# Patient Record
Sex: MALE | Race: WHITE | NOT HISPANIC OR LATINO | URBAN - METROPOLITAN AREA
[De-identification: names, ages, dates, MRNs, and addresses within clinical notes are randomized per-mention and may not be internally consistent; named-entity substitution may affect disease eponyms.]

---

## 2023-01-11 ENCOUNTER — OFFICE VISIT (OUTPATIENT)
Dept: FAMILY MEDICINE CLINIC | Facility: CLINIC | Age: 63
End: 2023-01-11

## 2023-01-11 VITALS
HEART RATE: 77 BPM | OXYGEN SATURATION: 97 % | TEMPERATURE: 97.4 F | WEIGHT: 233.8 LBS | RESPIRATION RATE: 16 BRPM | HEIGHT: 72 IN | BODY MASS INDEX: 31.67 KG/M2 | SYSTOLIC BLOOD PRESSURE: 170 MMHG | DIASTOLIC BLOOD PRESSURE: 98 MMHG

## 2023-01-11 DIAGNOSIS — Z00.00 ANNUAL PHYSICAL EXAM: Primary | ICD-10-CM

## 2023-01-11 DIAGNOSIS — I10 PRIMARY HYPERTENSION: ICD-10-CM

## 2023-01-11 DIAGNOSIS — K40.90 LEFT INGUINAL HERNIA: ICD-10-CM

## 2023-01-11 DIAGNOSIS — Z13.6 SCREENING FOR CARDIOVASCULAR CONDITION: ICD-10-CM

## 2023-01-11 DIAGNOSIS — Z13.1 SCREENING FOR DIABETES MELLITUS: ICD-10-CM

## 2023-01-11 DIAGNOSIS — Z12.5 SCREENING FOR PROSTATE CANCER: ICD-10-CM

## 2023-01-11 DIAGNOSIS — R53.83 OTHER FATIGUE: ICD-10-CM

## 2023-01-11 DIAGNOSIS — Z12.2 SCREENING FOR LUNG CANCER: ICD-10-CM

## 2023-01-11 DIAGNOSIS — Z23 IMMUNIZATION DUE: ICD-10-CM

## 2023-01-11 DIAGNOSIS — Z12.11 SCREENING FOR COLON CANCER: ICD-10-CM

## 2023-01-11 DIAGNOSIS — L72.0 EPIDERMAL INCLUSION CYST: ICD-10-CM

## 2023-01-11 DIAGNOSIS — E55.9 VITAMIN D DEFICIENCY: ICD-10-CM

## 2023-01-11 RX ORDER — LISINOPRIL 10 MG/1
10 TABLET ORAL DAILY
Qty: 90 TABLET | Refills: 3 | Status: SHIPPED | OUTPATIENT
Start: 2023-01-11

## 2023-01-11 RX ORDER — MULTIVIT WITH MINERALS/LUTEIN
1000 TABLET ORAL DAILY
COMMUNITY

## 2023-01-11 NOTE — PATIENT INSTRUCTIONS

## 2023-01-11 NOTE — PROGRESS NOTES
[facility]    NAME: Bladimir Clayton  AGE: 60 y o  SEX: male  : 1960     DATE: 2023     Assessment and Plan:     Problem List Items Addressed This Visit    None  Visit Diagnoses     Annual physical exam    -  Primary    Bill is stable on exam   He is to work on a lower carb/salt diet, and getting regular exercise  FBW ordered  Primary hypertension        Starting Lisinopril at this time  Relevant Medications    lisinopril (ZESTRIL) 10 mg tablet    Screening for diabetes mellitus        Relevant Orders    Comprehensive metabolic panel    Screening for cardiovascular condition        Relevant Orders    Lipid Panel with Direct LDL reflex    Other fatigue        Relevant Orders    CBC and differential    TSH, 3rd generation with Free T4 reflex    Screening for prostate cancer        PSA incl with FBW ordered  Relevant Orders    PSA, Total Screen    Screening for colon cancer        Pt referred to GI  Relevant Orders    Ambulatory Referral to Gastroenterology    Vitamin D deficiency        Hx of - Vit D level incl with FBW ordered  Relevant Orders    Vitamin D 25 hydroxy    Immunization due        Flu Vaccine given IM, and tolerated well  Relevant Orders    influenza vaccine, quadrivalent, recombinant, PF, 0 5 mL, for patients 18 yr+ (FLUBLOK) (Completed)    Screening for lung cancer        Pt quit smoking in  -   LDCT Chest ordered  Relevant Orders    CT lung screening program    Left inguinal hernia        Pt referred to General Surgery  Relevant Orders    Ambulatory Referral to General Surgery    Epidermal inclusion cyst        As above  Relevant Orders    Ambulatory Referral to General Surgery          Immunizations and preventive care screenings were discussed with patient today  Appropriate education was printed on patient's after visit summary      Discussed risks and benefits of prostate cancer screening  We discussed the controversial history of PSA screening for prostate cancer in the [place] as well as the risk of over detection and over treatment of prostate cancer by way of PSA screening  The patient understands that PSA blood testing is an imperfect way to screen for prostate cancer and that elevated PSA levels in the blood may also be caused by infection, inflammation, prostatic trauma or manipulation, urological procedures, or by benign prostatic enlargement  The role of the digital rectal examination in prostate cancer screening was also discussed and I discussed with him that there is large interobserver variability in the findings of digital rectal examination  Counseling:  Dental Health: discussed importance of regular tooth brushing, flossing, and dental visits  · Exercise: the importance of regular exercise/physical activity was discussed  Recommend exercise 3-5 times per week for at least 30 minutes  BMI Counseling: Body mass index is 31 32 kg/m²  The BMI is above normal  Nutrition recommendations include moderation in carbohydrate intake  Exercise recommendations include exercising 3-5 times per week  No pharmacotherapy was ordered  Patient referred to PCP  Rationale for BMI follow-up plan is due to patient being overweight or obese  Depression Screening and Follow-up Plan: Patient was screened for depression during today's encounter  They screened negative with a PHQ-2 score of 0  Return in 3 months (on 4/11/2023) for Recheck  Chief Complaint:     Chief Complaint   Patient presents with   • New Patient Visit   • Physical Exam      History of Present Illness:     Adult Annual Physical   Patient here for a comprehensive physical exam  The patient reports no problems  Pt has not seen a doctor in some time now  Has not had colon cancer screening yet - we discussed  He is vaccinated against COV-19    Pt works as a manager at [employer], has his own recruiting business, 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), etc     3 sons  Diet and Physical Activity  · Diet/Nutrition: limited junk food  · Exercise: walking  Depression Screening  PHQ-2/9 Depression Screening    Little interest or pleasure in doing things: 0 - not at all  Feeling down, depressed, or hopeless: 0 - not at all  PHQ-2 Score: 0  PHQ-2 Interpretation: Negative depression screen       General Health  · Sleep: sleeps well  · Hearing: normal - bilateral   · Vision: no vision problems  · Dental: regular dental visits   Health  · Symptoms include: none     Review of Systems:     Review of Systems   Respiratory: Negative for shortness of breath  Cardiovascular: Negative for chest pain  Gastrointestinal: Negative for abdominal pain and blood in stool  Genitourinary: Negative for decreased urine volume and difficulty urinating  Psychiatric/Behavioral: Negative for dysphoric mood  The patient is not nervous/anxious  Past Medical History:     History reviewed  No pertinent past medical history     Past Surgical History:     Past Surgical History:   Procedure Laterality Date   • TONSILLECTOMY        Family History:     Family History   Problem Relation Age of Onset   • Breast cancer Mother    • Liver cancer Paternal Grandmother       Social History:     Social History     Socioeconomic History   • Marital status: /Civil Union     Spouse name: None   • Number of children: None   • Years of education: None   • Highest education level: None   Occupational History   • None   Tobacco Use   • Smoking status: Never   • Smokeless tobacco: Never   Vaping Use   • Vaping Use: Never used   Substance and Sexual Activity   • Alcohol use: Not Currently   • Drug use: Never   • Sexual activity: None   Other Topics Concern   • None   Social History Narrative   • None     Social Determinants of Health     Financial Resource Strain: Not on file   Food Insecurity: Not on file Transportation Needs: Not on file   Physical Activity: Not on file   Stress: Not on file   Social Connections: Not on file   Intimate Partner Violence: Not on file   Housing Stability: Not on file      Current Medications:     Current Outpatient Medications   Medication Sig Dispense Refill   • Ascorbic Acid (vitamin C) 1000 MG tablet Take 1,000 mg by mouth daily     • Cholecalciferol (VITAMIN D3 PO) Take 2,000 Units by mouth     • lisinopril (ZESTRIL) 10 mg tablet Take 1 tablet (10 mg total) by mouth daily 90 tablet 3   • LYSINE PO Take 1,000 mg by mouth     • Magnesium Oxide (MAG-CAPS PO) Take 400 mg by mouth     • Multiple Vitamins-Minerals (ONE-A-DAY 50 PLUS PO) Take by mouth     • Zinc Acetate, Oral, (ZINC ACETATE PO) Take 50 mg by mouth       No current facility-administered medications for this visit  Allergies:     No Known Allergies   Physical Exam:     /98   Pulse 77   Temp (!) 97 4 °F (36 3 °C)   Resp 16   Ht 6' 0 44" (1 84 m)   Wt 106 kg (233 lb 12 8 oz)   SpO2 97%   BMI 31 32 kg/m²   Repeat BP = 160/102 LA seated  Physical Exam  Vitals and nursing note reviewed  Constitutional:       General: He is not in acute distress  Appearance: Normal appearance  He is not ill-appearing, toxic-appearing or diaphoretic  HENT:      Head: Normocephalic and atraumatic  Eyes:      General: No scleral icterus  Conjunctiva/sclera: Conjunctivae normal    Cardiovascular:      Rate and Rhythm: Normal rate and regular rhythm  Heart sounds: Normal heart sounds  No murmur heard  No friction rub  No gallop  Pulmonary:      Effort: Pulmonary effort is normal  No respiratory distress  Breath sounds: Normal breath sounds  No stridor  No wheezing or rales  Chest:      Chest wall: No tenderness  Abdominal:      General: Abdomen is flat  Bowel sounds are normal  There is no distension  Palpations: Abdomen is soft  There is no mass  Tenderness:  There is no abdominal tenderness  There is no guarding or rebound  Hernia: A hernia is present  Hernia is present in the left inguinal area  There is no hernia in the right inguinal area  Genitourinary:     Penis: Normal        Comments: Pt with sizeable, reducible, non-tender, indirect left inguinal hernia; no erythema to the skin  Musculoskeletal:      Cervical back: Normal range of motion and neck supple  No rigidity or tenderness  Lymphadenopathy:      Cervical: No cervical adenopathy  Skin:         Neurological:      Mental Status: He is alert and oriented to person, place, and time  Psychiatric:         Mood and Affect: Mood normal          Behavior: Behavior normal          Thought Content: Thought content normal          Judgment: Judgment normal           José Miguel Em was seen today for new patient visit and physical exam     Diagnoses and all orders for this visit:    Annual physical exam  Comments:  Holden Richmond is stable on exam   He is to work on a lower carb/salt diet, and getting regular exercise  FBW ordered  Primary hypertension  Comments:  Starting Lisinopril at this time  Orders:  -     lisinopril (ZESTRIL) 10 mg tablet; Take 1 tablet (10 mg total) by mouth daily    Screening for diabetes mellitus  -     Comprehensive metabolic panel; Future    Screening for cardiovascular condition  -     Lipid Panel with Direct LDL reflex; Future    Other fatigue  -     CBC and differential; Future  -     TSH, 3rd generation with Free T4 reflex; Future    Screening for prostate cancer  Comments:  PSA incl with FBW ordered  Orders:  -     PSA, Total Screen; Future    Screening for colon cancer  Comments:  Pt referred to GI  Orders:  -     Ambulatory Referral to Gastroenterology; Future    Vitamin D deficiency  Comments:  Hx of - Vit D level incl with FBW ordered  Orders:  -     Vitamin D 25 hydroxy; Future    Immunization due  Comments:  Flu Vaccine given IM, and tolerated well    Orders:  -     influenza vaccine, quadrivalent, recombinant, PF, 0 5 mL, for patients 18 yr+ (FLUBLOK)    Screening for lung cancer  Comments:  Pt quit smoking in 2011 - 2012  LDCT Chest ordered  Orders:  -     CT lung screening program; Future    Left inguinal hernia  Comments:  Pt referred to General Surgery  Orders:  -     Ambulatory Referral to General Surgery; Future    Epidermal inclusion cyst  Comments:  As above  Orders:  -     Ambulatory Referral to General Surgery;  Future          [address],   [address] [facility]

## 2023-01-12 ENCOUNTER — PREP FOR PROCEDURE (OUTPATIENT)
Dept: GASTROENTEROLOGY | Facility: CLINIC | Age: 63
End: 2023-01-12

## 2023-01-12 ENCOUNTER — TELEPHONE (OUTPATIENT)
Dept: GASTROENTEROLOGY | Facility: CLINIC | Age: 63
End: 2023-01-12

## 2023-01-12 DIAGNOSIS — Z12.11 SCREENING FOR COLON CANCER: Primary | ICD-10-CM

## 2023-01-12 NOTE — TELEPHONE ENCOUNTER
Scheduled date of colonoscopy (as of today): 2/14/23    Physician performing colonoscopy: Blaise    Location of colonoscopy: [facility]    Pt PASSED OA

## 2023-01-25 ENCOUNTER — CONSULT (OUTPATIENT)
Dept: SURGERY | Facility: CLINIC | Age: 63
End: 2023-01-25

## 2023-01-25 VITALS
SYSTOLIC BLOOD PRESSURE: 142 MMHG | WEIGHT: 223 LBS | HEIGHT: 72 IN | HEART RATE: 77 BPM | DIASTOLIC BLOOD PRESSURE: 82 MMHG | BODY MASS INDEX: 30.2 KG/M2

## 2023-01-25 DIAGNOSIS — K40.90 LEFT INGUINAL HERNIA: ICD-10-CM

## 2023-01-25 DIAGNOSIS — L72.0 EPIDERMAL INCLUSION CYST: ICD-10-CM

## 2023-01-25 NOTE — PROGRESS NOTES
Assessment/Plan:    Diagnoses and all orders for this visit:    Left inguinal hernia    Epidermal inclusion cyst    Risks and benefits of left inguinal hernia repair as well as excision of left upper back cyst x2 were discussed with him he agrees to proceed  Discussed risks and benefits including potential for recurrence of either the hernia or the cysts, spermatic cord injury, or pain issues and he agrees to proceed  He will check his schedule and be in touch with our office      Subjective:      Patient ID: Nia Giang is a 60 y o  male  Patient presents for left inguinal hernia consult  States he has had a bulge LLQ for 2 to 3 years  Denies pain  Does not limit his activities  Patient presents for evaluation of an upper back cyst   He has had the cyst for 5 to 6 years  Denies pain or size change  Recently underwent a yearly physical     Has had left inguinal hernia for many years does not practically bother him  He is a musician and plays the [employer]  The following portions of the patient's history were reviewed and updated as appropriate:     He  has a past medical history of Hypertension  He  has a past surgical history that includes Tonsillectomy  His family history includes Breast cancer in his mother; Liver cancer in his paternal grandmother  He  reports that he has never smoked  He has never used smokeless tobacco  He reports current alcohol use of about 1 0 standard drink per week  He reports that he does not use drugs    Current Outpatient Medications   Medication Sig Dispense Refill   • Ascorbic Acid (vitamin C) 1000 MG tablet Take 1,000 mg by mouth daily     • Cholecalciferol (VITAMIN D3 PO) Take 2,000 Units by mouth     • lisinopril (ZESTRIL) 10 mg tablet Take 1 tablet (10 mg total) by mouth daily 90 tablet 3   • LYSINE PO Take 1,000 mg by mouth     • Magnesium Oxide (MAG-CAPS PO) Take 400 mg by mouth     • Multiple Vitamins-Minerals (ONE-A-DAY 50 PLUS PO) Take by mouth     • Zinc Acetate, Oral, (ZINC ACETATE PO) Take 50 mg by mouth       No current facility-administered medications for this visit  He has No Known Allergies       Review of Systems   All other systems reviewed and are negative  Objective:      /82   Pulse 77   Ht 6' (1 829 m)   Wt 101 kg (223 lb)   BMI 30 24 kg/m²          Physical Exam  HENT:      Head: Atraumatic  Nose: Nose normal       Mouth/Throat:      Mouth: Mucous membranes are moist    Eyes:      Extraocular Movements: Extraocular movements intact  Cardiovascular:      Rate and Rhythm: Normal rate  Pulmonary:      Effort: Pulmonary effort is normal    Abdominal:      General: Abdomen is flat  Bowel sounds are normal       Palpations: Abdomen is soft  Hernia: A hernia (Soft reducible left inguinal hernia into the scrotum) is present  Musculoskeletal:      Cervical back: Normal range of motion  Skin:     General: Skin is warm and dry  Comments: Sebaceous cyst x2 of the right upper back  Each cyst approximately 2 cm in size  No signs of infection   Neurological:      Mental Status: He is alert         Extremities: No edema

## 2023-01-30 ENCOUNTER — PREP FOR PROCEDURE (OUTPATIENT)
Dept: SURGERY | Facility: CLINIC | Age: 63
End: 2023-01-30

## 2023-01-30 DIAGNOSIS — K40.90 INGUINAL HERNIA: Primary | ICD-10-CM

## 2023-01-30 DIAGNOSIS — R22.2 MASS ON BACK: ICD-10-CM

## 2023-02-09 ENCOUNTER — TELEPHONE (OUTPATIENT)
Dept: OTHER | Facility: OTHER | Age: 63
End: 2023-02-09

## 2023-02-10 NOTE — TELEPHONE ENCOUNTER
Pt advised that the hard copy of the orders is not required when going to an [facility] lab  Pt advised to specify which provider's labs he'd like done at the time of the visit  Pt also given prep instructions for labs per the orders

## 2023-02-17 ENCOUNTER — APPOINTMENT (OUTPATIENT)
Dept: LAB | Age: 63
End: 2023-02-17

## 2023-02-17 DIAGNOSIS — Z13.6 SCREENING FOR CARDIOVASCULAR CONDITION: ICD-10-CM

## 2023-02-17 DIAGNOSIS — R53.83 OTHER FATIGUE: ICD-10-CM

## 2023-02-17 DIAGNOSIS — Z12.5 SCREENING FOR PROSTATE CANCER: ICD-10-CM

## 2023-02-17 DIAGNOSIS — Z13.1 SCREENING FOR DIABETES MELLITUS: ICD-10-CM

## 2023-02-17 DIAGNOSIS — E55.9 VITAMIN D DEFICIENCY: ICD-10-CM

## 2023-02-17 LAB
25(OH)D3 SERPL-MCNC: 37.2 NG/ML (ref 30–100)
ALBUMIN SERPL BCP-MCNC: 3.5 G/DL (ref 3.5–5)
ALP SERPL-CCNC: 51 U/L (ref 46–116)
ALT SERPL W P-5'-P-CCNC: 35 U/L (ref 12–78)
ANION GAP SERPL CALCULATED.3IONS-SCNC: 4 MMOL/L (ref 4–13)
AST SERPL W P-5'-P-CCNC: 16 U/L (ref 5–45)
BASOPHILS # BLD AUTO: 0.03 THOUSANDS/ÂΜL (ref 0–0.1)
BASOPHILS NFR BLD AUTO: 0 % (ref 0–1)
BILIRUB SERPL-MCNC: 0.78 MG/DL (ref 0.2–1)
BUN SERPL-MCNC: 16 MG/DL (ref 5–25)
CALCIUM SERPL-MCNC: 9.1 MG/DL (ref 8.3–10.1)
CHLORIDE SERPL-SCNC: 107 MMOL/L (ref 96–108)
CHOLEST SERPL-MCNC: 196 MG/DL
CO2 SERPL-SCNC: 28 MMOL/L (ref 21–32)
CREAT SERPL-MCNC: 1.05 MG/DL (ref 0.6–1.3)
EOSINOPHIL # BLD AUTO: 0.27 THOUSAND/ÂΜL (ref 0–0.61)
EOSINOPHIL NFR BLD AUTO: 3 % (ref 0–6)
ERYTHROCYTE [DISTWIDTH] IN BLOOD BY AUTOMATED COUNT: 12.8 % (ref 11.6–15.1)
GFR SERPL CREATININE-BSD FRML MDRD: 75 ML/MIN/1.73SQ M
GLUCOSE P FAST SERPL-MCNC: 99 MG/DL (ref 65–99)
HCT VFR BLD AUTO: 47.3 % (ref 36.5–49.3)
HDLC SERPL-MCNC: 43 MG/DL
HGB BLD-MCNC: 15.6 G/DL (ref 12–17)
IMM GRANULOCYTES # BLD AUTO: 0.02 THOUSAND/UL (ref 0–0.2)
IMM GRANULOCYTES NFR BLD AUTO: 0 % (ref 0–2)
LDLC SERPL CALC-MCNC: 125 MG/DL (ref 0–100)
LYMPHOCYTES # BLD AUTO: 1.74 THOUSANDS/ÂΜL (ref 0.6–4.47)
LYMPHOCYTES NFR BLD AUTO: 21 % (ref 14–44)
MCH RBC QN AUTO: 32.1 PG (ref 26.8–34.3)
MCHC RBC AUTO-ENTMCNC: 33 G/DL (ref 31.4–37.4)
MCV RBC AUTO: 97 FL (ref 82–98)
MONOCYTES # BLD AUTO: 0.56 THOUSAND/ÂΜL (ref 0.17–1.22)
MONOCYTES NFR BLD AUTO: 7 % (ref 4–12)
NEUTROPHILS # BLD AUTO: 5.5 THOUSANDS/ÂΜL (ref 1.85–7.62)
NEUTS SEG NFR BLD AUTO: 69 % (ref 43–75)
NRBC BLD AUTO-RTO: 0 /100 WBCS
PLATELET # BLD AUTO: 256 THOUSANDS/UL (ref 149–390)
PMV BLD AUTO: 10.7 FL (ref 8.9–12.7)
POTASSIUM SERPL-SCNC: 4.7 MMOL/L (ref 3.5–5.3)
PROT SERPL-MCNC: 7.4 G/DL (ref 6.4–8.4)
PSA SERPL-MCNC: 1.2 NG/ML (ref 0–4)
RBC # BLD AUTO: 4.86 MILLION/UL (ref 3.88–5.62)
SODIUM SERPL-SCNC: 139 MMOL/L (ref 135–147)
TRIGL SERPL-MCNC: 139 MG/DL
TSH SERPL DL<=0.05 MIU/L-ACNC: 1.53 UIU/ML (ref 0.45–4.5)
WBC # BLD AUTO: 8.12 THOUSAND/UL (ref 4.31–10.16)

## 2023-03-10 ENCOUNTER — OFFICE VISIT (OUTPATIENT)
Dept: FAMILY MEDICINE CLINIC | Facility: CLINIC | Age: 63
End: 2023-03-10

## 2023-03-10 VITALS
HEART RATE: 70 BPM | TEMPERATURE: 97.3 F | WEIGHT: 218.8 LBS | RESPIRATION RATE: 14 BRPM | SYSTOLIC BLOOD PRESSURE: 134 MMHG | DIASTOLIC BLOOD PRESSURE: 80 MMHG | OXYGEN SATURATION: 98 % | BODY MASS INDEX: 29.64 KG/M2 | HEIGHT: 72 IN

## 2023-03-10 DIAGNOSIS — I10 PRIMARY HYPERTENSION: Primary | ICD-10-CM

## 2023-03-10 DIAGNOSIS — K40.90 LEFT INGUINAL HERNIA: ICD-10-CM

## 2023-03-10 DIAGNOSIS — E55.9 VITAMIN D DEFICIENCY: ICD-10-CM

## 2023-03-10 DIAGNOSIS — Z13.1 SCREENING FOR DIABETES MELLITUS: ICD-10-CM

## 2023-03-10 DIAGNOSIS — E78.5 MILD HYPERLIPIDEMIA: ICD-10-CM

## 2023-03-10 RX ORDER — LOSARTAN POTASSIUM 25 MG/1
25 TABLET ORAL DAILY
Qty: 90 TABLET | Refills: 1 | Status: SHIPPED | OUTPATIENT
Start: 2023-03-10

## 2023-03-10 NOTE — PROGRESS NOTES
FAMILY PRACTICE OFFICE VISIT       NAME: Severiano Jefferson  AGE: 60 y o  SEX: male       : 1960        MRN: [id number]    DATE: 3/10/2023  TIME: 11:01 AM    Assessment and Plan   1  Primary hypertension  Comments:  Pt stable - condition controlled on present management, but having SE (Cough)  Will stop Lisinopril, and start Losartan  Continue to monitor BP's at home  Orders:  -     losartan (COZAAR) 25 mg tablet; Take 1 tablet (25 mg total) by mouth daily    2  Mild hyperlipidemia  Comments:  Mild  Pt to continue a lower carb/saturated fat/salt diet, and remain active  Repeat FBW prior to next OV  Orders:  -     Lipid Panel with Direct LDL reflex; Future    3  Vitamin D deficiency  Comments:  Stable on his OTC Vit D3 supplement  4  Left inguinal hernia  Comments:  Set for surgery in 2023, with Dr Lenora Gonzalez  5  Screening for diabetes mellitus  -     Comprehensive metabolic panel; Future         There are no Patient Instructions on file for this visit  Chief Complaint     Chief Complaint   Patient presents with   • Follow-up     Patient being seen for 2 month follow up        History of Present Illness   Severiano Jefferson is a 60y o -year-old male who presents in f/u today  Presents with his wife  Cough on Lisinopril - has also worked on diet, and lost some weight! Walking daily  Will have left inguinal repair in 2023 with Dr Lenora Gonzalez  Colonoscopy will be moving colonoscopy again  Labs reviewed  Review of Systems   Review of Systems   Constitutional: Negative for activity change  Respiratory: Positive for cough  Negative for shortness of breath  Cardiovascular: Negative for chest pain  Gastrointestinal:        +Left inguinal hernia - still intermittently sore  Neurological: Negative for dizziness         Active Problem List     Patient Active Problem List   Diagnosis   • Left inguinal hernia   • Epidermal inclusion cyst         Past Medical History:  Past Medical History: Diagnosis Date   • Hypertension        Past Surgical History:  Past Surgical History:   Procedure Laterality Date   • TONSILLECTOMY         Family History:  Family History   Problem Relation Age of Onset   • Breast cancer Mother    • Liver cancer Paternal Grandmother        Social History:  Social History     Socioeconomic History   • Marital status: /Civil Union     Spouse name: Not on file   • Number of children: Not on file   • Years of education: Not on file   • Highest education level: Not on file   Occupational History   • Not on file   Tobacco Use   • Smoking status: Never   • Smokeless tobacco: Never   Vaping Use   • Vaping Use: Never used   Substance and Sexual Activity   • Alcohol use: Yes     Alcohol/week: 1 0 standard drink     Types: 1 Cans of beer per week   • Drug use: Never   • Sexual activity: Not on file   Other Topics Concern   • Not on file   Social History Narrative   • Not on file     Social Determinants of Health     Financial Resource Strain: Not on file   Food Insecurity: Not on file   Transportation Needs: Not on file   Physical Activity: Not on file   Stress: Not on file   Social Connections: Not on file   Intimate Partner Violence: Not on file   Housing Stability: Not on file       Objective     Vitals:    03/10/23 0926   BP: 134/80   Pulse: 70   Resp: 14   Temp: (!) 97 3 °F (36 3 °C)   SpO2: 98%     Wt Readings from Last 3 Encounters:   03/10/23 99 2 kg (218 lb 12 8 oz)   01/25/23 101 kg (223 lb)   01/11/23 106 kg (233 lb 12 8 oz)       Physical Exam  Vitals and nursing note reviewed  Constitutional:       General: He is not in acute distress  Appearance: Normal appearance  He is not ill-appearing, toxic-appearing or diaphoretic  HENT:      Head: Normocephalic and atraumatic        Right Ear: Tympanic membrane, ear canal and external ear normal       Left Ear: Tympanic membrane, ear canal and external ear normal       Mouth/Throat:      Mouth: Mucous membranes are moist  Pharynx: Oropharynx is clear  No oropharyngeal exudate or posterior oropharyngeal erythema  Eyes:      General: No scleral icterus  Conjunctiva/sclera: Conjunctivae normal    Cardiovascular:      Rate and Rhythm: Normal rate and regular rhythm  Heart sounds: Normal heart sounds  No murmur heard  No friction rub  No gallop  Pulmonary:      Effort: Pulmonary effort is normal  No respiratory distress  Breath sounds: Normal breath sounds  No stridor  No wheezing, rhonchi or rales  Musculoskeletal:      Cervical back: Normal range of motion and neck supple  No rigidity or tenderness  Lymphadenopathy:      Cervical: No cervical adenopathy  Neurological:      Mental Status: He is alert and oriented to person, place, and time  Psychiatric:         Mood and Affect: Mood normal          Behavior: Behavior normal          Thought Content:  Thought content normal          Judgment: Judgment normal          Pertinent Laboratory/Diagnostic Studies:  Lab Results   Component Value Date    BUN 16 02/17/2023    CREATININE 1 05 02/17/2023    CALCIUM 9 1 02/17/2023    K 4 7 02/17/2023    CO2 28 02/17/2023     02/17/2023     Lab Results   Component Value Date    ALT 35 02/17/2023    AST 16 02/17/2023    ALKPHOS 51 02/17/2023       Lab Results   Component Value Date    WBC 8 12 02/17/2023    HGB 15 6 02/17/2023    HCT 47 3 02/17/2023    MCV 97 02/17/2023     02/17/2023       No results found for: TSH    No results found for: CHOL  Lab Results   Component Value Date    TRIG 139 02/17/2023     Lab Results   Component Value Date    HDL 43 02/17/2023     Lab Results   Component Value Date    LDLCALC 125 (H) 02/17/2023     No results found for: HGBA1C    Results for orders placed or performed in visit on 02/17/23   Comprehensive metabolic panel   Result Value Ref Range    Sodium 139 135 - 147 mmol/L    Potassium 4 7 3 5 - 5 3 mmol/L    Chloride 107 96 - 108 mmol/L    CO2 28 21 - 32 mmol/L    ANION GAP 4 4 - 13 mmol/L    BUN 16 5 - 25 mg/dL    Creatinine 1 05 0 60 - 1 30 mg/dL    Glucose, Fasting 99 65 - 99 mg/dL    Calcium 9 1 8 3 - 10 1 mg/dL    AST 16 5 - 45 U/L    ALT 35 12 - 78 U/L    Alkaline Phosphatase 51 46 - 116 U/L    Total Protein 7 4 6 4 - 8 4 g/dL    Albumin 3 5 3 5 - 5 0 g/dL    Total Bilirubin 0 78 0 20 - 1 00 mg/dL    eGFR 75 ml/min/1 73sq m   Lipid Panel with Direct LDL reflex   Result Value Ref Range    Cholesterol 196 See Comment mg/dL    Triglycerides 139 See Comment mg/dL    HDL, Direct 43 >=40 mg/dL    LDL Calculated 125 (H) 0 - 100 mg/dL   CBC and differential   Result Value Ref Range    WBC 8 12 4 31 - 10 16 Thousand/uL    RBC 4 86 3 88 - 5 62 Million/uL    Hemoglobin 15 6 12 0 - 17 0 g/dL    Hematocrit 47 3 36 5 - 49 3 %    MCV 97 82 - 98 fL    MCH 32 1 26 8 - 34 3 pg    MCHC 33 0 31 4 - 37 4 g/dL    RDW 12 8 11 6 - 15 1 %    MPV 10 7 8 9 - 12 7 fL    Platelets 877 226 - 719 Thousands/uL    nRBC 0 /100 WBCs    Neutrophils Relative 69 43 - 75 %    Immat GRANS % 0 0 - 2 %    Lymphocytes Relative 21 14 - 44 %    Monocytes Relative 7 4 - 12 %    Eosinophils Relative 3 0 - 6 %    Basophils Relative 0 0 - 1 %    Neutrophils Absolute 5 50 1 85 - 7 62 Thousands/µL    Immature Grans Absolute 0 02 0 00 - 0 20 Thousand/uL    Lymphocytes Absolute 1 74 0 60 - 4 47 Thousands/µL    Monocytes Absolute 0 56 0 17 - 1 22 Thousand/µL    Eosinophils Absolute 0 27 0 00 - 0 61 Thousand/µL    Basophils Absolute 0 03 0 00 - 0 10 Thousands/µL   TSH, 3rd generation with Free T4 reflex   Result Value Ref Range    TSH 3RD GENERATON 1 530 0 450 - 4 500 uIU/mL   PSA, Total Screen   Result Value Ref Range    PSA 1 2 0 0 - 4 0 ng/mL   Vitamin D 25 hydroxy   Result Value Ref Range    Vit D, 25-Hydroxy 37 2 30 0 - 100 0 ng/mL       Orders Placed This Encounter   Procedures   • Comprehensive metabolic panel   • Lipid Panel with Direct LDL reflex       ALLERGIES:  No Known Allergies    Current Medications     Current Outpatient Medications   Medication Sig Dispense Refill   • Ascorbic Acid (vitamin C) 1000 MG tablet Take 1,000 mg by mouth daily     • Cholecalciferol (VITAMIN D3 PO) Take 2,000 Units by mouth     • losartan (COZAAR) 25 mg tablet Take 1 tablet (25 mg total) by mouth daily 90 tablet 1   • LYSINE PO Take 1,000 mg by mouth     • Magnesium Oxide (MAG-CAPS PO) Take 400 mg by mouth     • Multiple Vitamins-Minerals (ONE-A-DAY 50 PLUS PO) Take by mouth     • Zinc Acetate, Oral, (ZINC ACETATE PO) Take 50 mg by mouth       No current facility-administered medications for this visit           Health Maintenance     Health Maintenance   Topic Date Due   • Hepatitis C Screening  Never done   • HIV Screening  Never done   • Colorectal Cancer Screening  Never done   • COVID-19 Vaccine (4 - Booster for Pfizer series) 03/01/2022   • Depression Screening  01/11/2024   • BMI: Followup Plan  01/11/2024   • Annual Physical  01/11/2024   • BMI: Adult  03/10/2024   • DTaP,Tdap,and Td Vaccines (2 - Td or Tdap) 12/28/2025   • Influenza Vaccine  Completed   • Pneumococcal Vaccine: Pediatrics (0 to 5 Years) and At-Risk Patients (6 to 59 Years)  Aged Out   • HIB Vaccine  Aged Out   • IPV Vaccine  Aged Out   • Hepatitis A Vaccine  Aged Out   • Meningococcal ACWY Vaccine  Aged Out   • HPV Vaccine  Aged [employer] History   Administered Date(s) Administered   • COVID-19 PFIZER VACCINE 0 3 ML IM 03/10/2021, 04/01/2021, 01/04/2022   • INFLUENZA 12/28/2015   • Influenza, recombinant, quadrivalent,injectable, preservative free 01/11/2023   • Tdap 12/28/2015          [address] Jesus Melendez DO

## 2023-03-30 ENCOUNTER — OFFICE VISIT (OUTPATIENT)
Dept: LAB | Age: 63
End: 2023-03-30

## 2023-03-30 ENCOUNTER — APPOINTMENT (OUTPATIENT)
Dept: LAB | Age: 63
End: 2023-03-30

## 2023-03-30 DIAGNOSIS — K40.90 INGUINAL HERNIA: ICD-10-CM

## 2023-03-30 DIAGNOSIS — R22.2 MASS ON BACK: ICD-10-CM

## 2023-03-30 LAB
ANION GAP SERPL CALCULATED.3IONS-SCNC: 2 MMOL/L (ref 4–13)
ATRIAL RATE: 77 BPM
BUN SERPL-MCNC: 19 MG/DL (ref 5–25)
CALCIUM SERPL-MCNC: 9 MG/DL (ref 8.3–10.1)
CHLORIDE SERPL-SCNC: 107 MMOL/L (ref 96–108)
CO2 SERPL-SCNC: 29 MMOL/L (ref 21–32)
CREAT SERPL-MCNC: 1.05 MG/DL (ref 0.6–1.3)
ERYTHROCYTE [DISTWIDTH] IN BLOOD BY AUTOMATED COUNT: 13.4 % (ref 11.6–15.1)
GFR SERPL CREATININE-BSD FRML MDRD: 75 ML/MIN/1.73SQ M
GLUCOSE P FAST SERPL-MCNC: 96 MG/DL (ref 65–99)
HCT VFR BLD AUTO: 45.5 % (ref 36.5–49.3)
HGB BLD-MCNC: 14.8 G/DL (ref 12–17)
MCH RBC QN AUTO: 31.3 PG (ref 26.8–34.3)
MCHC RBC AUTO-ENTMCNC: 32.5 G/DL (ref 31.4–37.4)
MCV RBC AUTO: 96 FL (ref 82–98)
P AXIS: 53 DEGREES
PLATELET # BLD AUTO: 264 THOUSANDS/UL (ref 149–390)
PMV BLD AUTO: 10.5 FL (ref 8.9–12.7)
POTASSIUM SERPL-SCNC: 4.1 MMOL/L (ref 3.5–5.3)
PR INTERVAL: 142 MS
QRS AXIS: 8 DEGREES
QRSD INTERVAL: 86 MS
QT INTERVAL: 400 MS
QTC INTERVAL: 452 MS
RBC # BLD AUTO: 4.73 MILLION/UL (ref 3.88–5.62)
SODIUM SERPL-SCNC: 138 MMOL/L (ref 135–147)
T WAVE AXIS: 20 DEGREES
VENTRICULAR RATE: 77 BPM
WBC # BLD AUTO: 8.55 THOUSAND/UL (ref 4.31–10.16)

## 2023-04-05 ENCOUNTER — OFFICE VISIT (OUTPATIENT)
Dept: SURGERY | Facility: CLINIC | Age: 63
End: 2023-04-05

## 2023-04-05 VITALS
SYSTOLIC BLOOD PRESSURE: 139 MMHG | WEIGHT: 215 LBS | DIASTOLIC BLOOD PRESSURE: 85 MMHG | HEART RATE: 78 BPM | BODY MASS INDEX: 29.12 KG/M2 | HEIGHT: 72 IN

## 2023-04-05 DIAGNOSIS — L72.0 EPIDERMAL INCLUSION CYST: ICD-10-CM

## 2023-04-05 DIAGNOSIS — K40.90 LEFT INGUINAL HERNIA: Primary | ICD-10-CM

## 2023-04-05 NOTE — PROGRESS NOTES
Assessment/Plan:    Diagnoses and all orders for this visit:    Left inguinal hernia    Epidermal inclusion cyst    Some benefits for left inguinal hernia repair including potential for spermatic cord injury, recurrence, or pain issues were discussed with him and he agrees to proceed  We will also remove to upper back epidermal inclusion cyst at the same time  Subjective:      Patient ID: Tracey Gregg is a 60 y o  male  Patient presents for pre op consult  Scheduled for left inguinal hernia repair and excision biopsy lesion/mass right upper back x2 4/20/2023    Seen in January for the above  Presents now for preoperative visit      The following portions of the patient's history were reviewed and updated as appropriate:     He  has a past medical history of Hypertension  He  has a past surgical history that includes Tonsillectomy  His family history includes Breast cancer in his mother; Liver cancer in his paternal grandmother  He  reports that he has never smoked  He has never used smokeless tobacco  He reports current alcohol use of about 1 0 standard drink per week  He reports that he does not use drugs  Current Outpatient Medications   Medication Sig Dispense Refill   • Ascorbic Acid (vitamin C) 1000 MG tablet Take 1,000 mg by mouth daily     • Cholecalciferol (VITAMIN D3 PO) Take 2,000 Units by mouth     • losartan (COZAAR) 25 mg tablet Take 1 tablet (25 mg total) by mouth daily 90 tablet 1   • LYSINE PO Take 1,000 mg by mouth     • Magnesium Oxide (MAG-CAPS PO) Take 400 mg by mouth     • Multiple Vitamins-Minerals (ONE-A-DAY 50 PLUS PO) Take by mouth     • Zinc Acetate, Oral, (ZINC ACETATE PO) Take 50 mg by mouth       No current facility-administered medications for this visit  He has No Known Allergies       Review of Systems   Gastrointestinal: Positive for abdominal pain  All other systems reviewed and are negative          Objective:      /85   Pulse 78   Ht 6' (1 829 m)   [employer] 97 5 kg (215 lb)   BMI 29 16 kg/m²          Physical Exam  Constitutional:       General: He is not in acute distress  HENT:      Head: Atraumatic  Mouth/Throat:      Mouth: Mucous membranes are moist    Eyes:      Extraocular Movements: Extraocular movements intact  Cardiovascular:      Rate and Rhythm: Normal rate  Pulmonary:      Effort: Pulmonary effort is normal    Abdominal:      General: Abdomen is flat  Palpations: Abdomen is soft  Hernia: A hernia (Reducible left inguinal hernia) is present  Musculoskeletal:      Cervical back: Normal range of motion  Skin:     General: Skin is warm and dry  Comments: Upper back with 2 sebaceous cysts  Each cyst is 2 cm in size   Neurological:      Mental Status: He is alert         Extremities: No edema

## 2023-04-05 NOTE — H&P (VIEW-ONLY)
Assessment/Plan:    Diagnoses and all orders for this visit:    Left inguinal hernia    Epidermal inclusion cyst    Some benefits for left inguinal hernia repair including potential for spermatic cord injury, recurrence, or pain issues were discussed with him and he agrees to proceed  We will also remove to upper back epidermal inclusion cyst at the same time  Subjective:      Patient ID: Sunny Harding is a 60 y o  male  Patient presents for pre op consult  Scheduled for left inguinal hernia repair and excision biopsy lesion/mass right upper back x2 4/20/2023    Seen in January for the above  Presents now for preoperative visit      The following portions of the patient's history were reviewed and updated as appropriate:     He  has a past medical history of Hypertension  He  has a past surgical history that includes Tonsillectomy  His family history includes Breast cancer in his mother; Liver cancer in his paternal grandmother  He  reports that he has never smoked  He has never used smokeless tobacco  He reports current alcohol use of about 1 0 standard drink per week  He reports that he does not use drugs  Current Outpatient Medications   Medication Sig Dispense Refill   • Ascorbic Acid (vitamin C) 1000 MG tablet Take 1,000 mg by mouth daily     • Cholecalciferol (VITAMIN D3 PO) Take 2,000 Units by mouth     • losartan (COZAAR) 25 mg tablet Take 1 tablet (25 mg total) by mouth daily 90 tablet 1   • LYSINE PO Take 1,000 mg by mouth     • Magnesium Oxide (MAG-CAPS PO) Take 400 mg by mouth     • Multiple Vitamins-Minerals (ONE-A-DAY 50 PLUS PO) Take by mouth     • Zinc Acetate, Oral, (ZINC ACETATE PO) Take 50 mg by mouth       No current facility-administered medications for this visit  He has No Known Allergies       Review of Systems   Gastrointestinal: Positive for abdominal pain  All other systems reviewed and are negative          Objective:      /85   Pulse 78   Ht 6' (1 829 m)   [employer] 97 5 kg (215 lb)   BMI 29 16 kg/m²          Physical Exam  Constitutional:       General: He is not in acute distress  HENT:      Head: Atraumatic  Mouth/Throat:      Mouth: Mucous membranes are moist    Eyes:      Extraocular Movements: Extraocular movements intact  Cardiovascular:      Rate and Rhythm: Normal rate  Pulmonary:      Effort: Pulmonary effort is normal    Abdominal:      General: Abdomen is flat  Palpations: Abdomen is soft  Hernia: A hernia (Reducible left inguinal hernia) is present  Musculoskeletal:      Cervical back: Normal range of motion  Skin:     General: Skin is warm and dry  Comments: Upper back with 2 sebaceous cysts  Each cyst is 2 cm in size   Neurological:      Mental Status: He is alert         Extremities: No edema

## 2023-04-11 RX ORDER — CEFAZOLIN SODIUM 2 G/50ML
2000 SOLUTION INTRAVENOUS ONCE
Status: COMPLETED | OUTPATIENT
Start: 2023-04-20 | End: 2023-04-20

## 2023-04-14 RX ORDER — ASCORBIC ACID 1000 MG
TABLET ORAL
COMMUNITY

## 2023-04-14 NOTE — PRE-PROCEDURE INSTRUCTIONS
Pre-Surgery Instructions:   Medication Instructions   • Ascorbic Acid (vitamin C) 1000 MG tablet Avoid 1 week prior to surgery    • Cholecalciferol (VITAMIN D3 PO) Avoid 1 week prior to surgery    • Coenzyme Q10 (Co Q 10) 10 MG CAPS Avoid 1 week prior to surgery    • losartan (COZAAR) 25 mg tablet continue as prescribed excluding DOS   • LYSINE PO Avoid 1 week prior to surgery    • Magnesium Oxide (MAG-CAPS PO) Avoid 1 week prior to surgery    • Multiple Vitamins-Minerals (ONE-A-DAY 50 PLUS PO) Avoid 1 week prior to surgery     Medication instructions for day surgery reviewed  Please use only a sip of water to take your instructed medications  Avoid all over the counter vitamins, supplements and NSAIDS for one week prior to surgery per anesthesia guidelines  Tylenol is ok to take as needed  You will receive a call one business day prior to surgery with an arrival time and hospital directions  If your surgery is scheduled on a Monday, the hospital will be calling you on the Friday prior to your surgery  If you have not heard from anyone by 8pm, please call the hospital supervisor through the hospital  at [phone]  Patricia Richardson [phone])  Do not eat or drink anything after midnight the night before your surgery, including candy, mints, lifesavers, or chewing gum  Do not drink alcohol 24hrs before your surgery  Try not to smoke at least 24hrs before your surgery  Follow the pre surgery showering instructions as listed in the [facility] Surgical Experience Booklet” or otherwise provided by your surgeon's office  Do not shave the surgical area 24 hours before surgery  Do not apply any lotions, creams, including makeup, cologne, deodorant, or perfumes after showering on the day of your surgery  No contact lenses, eye make-up, or artificial eyelashes  Remove nail polish, including gel polish, and any artificial, gel, or acrylic nails if possible   Remove all jewelry including rings and body piercing jewelry  Wear causal clothing that is easy to take on and off  Consider your type of surgery  Keep any valuables, jewelry, piercings at home  Please bring any specially ordered equipment (sling, braces) if indicated  Arrange for a responsible person to drive you to and from the hospital on the day of your surgery  Visitor Guidelines discussed  Call the surgeon's office with any new illnesses, exposures, or additional questions prior to surgery  Please reference your [facility] Surgical Experience Booklet” for additional information to prepare for your upcoming surgery

## 2023-04-20 ENCOUNTER — HOSPITAL ENCOUNTER (OUTPATIENT)
Facility: AMBULARY SURGERY CENTER | Age: 63
Setting detail: OUTPATIENT SURGERY
Discharge: HOME | End: 2023-04-20
Attending: SURGERY | Admitting: SURGERY

## 2023-04-20 VITALS
HEIGHT: 73 IN | BODY MASS INDEX: 28.92 KG/M2 | RESPIRATION RATE: 18 BRPM | HEART RATE: 71 BPM | DIASTOLIC BLOOD PRESSURE: 78 MMHG | SYSTOLIC BLOOD PRESSURE: 126 MMHG | WEIGHT: 218.2 LBS | TEMPERATURE: 97 F | OXYGEN SATURATION: 99 %

## 2023-04-20 DIAGNOSIS — L72.0 EPIDERMAL INCLUSION CYST: ICD-10-CM

## 2023-04-20 DIAGNOSIS — K40.90 INGUINAL HERNIA: ICD-10-CM

## 2023-04-20 DIAGNOSIS — R22.2 MASS ON BACK: ICD-10-CM

## 2023-04-20 DIAGNOSIS — K40.90 LEFT INGUINAL HERNIA: Primary | ICD-10-CM

## 2023-04-20 DEVICE — MODIFIED ONFLEX MESH
Type: IMPLANTABLE DEVICE | Site: GROIN | Status: FUNCTIONAL
Brand: MODIFIED ONFLEX MESH

## 2023-04-20 RX ORDER — OXYCODONE HYDROCHLORIDE 5 MG/1
5 TABLET ORAL EVERY 4 HOURS PRN
Status: DISCONTINUED | OUTPATIENT
Start: 2023-04-20 | End: 2023-04-20 | Stop reason: HOSPADM

## 2023-04-20 RX ORDER — ACETAMINOPHEN 325 MG/1
975 TABLET ORAL ONCE
Status: COMPLETED | OUTPATIENT
Start: 2023-04-20 | End: 2023-04-20

## 2023-04-20 RX ORDER — OXYCODONE HYDROCHLORIDE 5 MG/1
5 TABLET ORAL EVERY 4 HOURS PRN
Qty: 10 TABLET | Refills: 0 | Status: SHIPPED | OUTPATIENT
Start: 2023-04-20 | End: 2023-04-30

## 2023-04-20 RX ORDER — HYDROMORPHONE HCL/PF 1 MG/ML
0.5 SYRINGE (ML) INJECTION
Status: DISCONTINUED | OUTPATIENT
Start: 2023-04-20 | End: 2023-04-20 | Stop reason: HOSPADM

## 2023-04-20 RX ORDER — MAGNESIUM HYDROXIDE 1200 MG/15ML
LIQUID ORAL AS NEEDED
Status: DISCONTINUED | OUTPATIENT
Start: 2023-04-20 | End: 2023-04-20 | Stop reason: HOSPADM

## 2023-04-20 RX ORDER — FENTANYL CITRATE/PF 50 MCG/ML
25 SYRINGE (ML) INJECTION
Status: DISCONTINUED | OUTPATIENT
Start: 2023-04-20 | End: 2023-04-20 | Stop reason: HOSPADM

## 2023-04-20 RX ORDER — SODIUM CHLORIDE, SODIUM LACTATE, POTASSIUM CHLORIDE, CALCIUM CHLORIDE 600; 310; 30; 20 MG/100ML; MG/100ML; MG/100ML; MG/100ML
125 INJECTION, SOLUTION INTRAVENOUS CONTINUOUS
Status: DISCONTINUED | OUTPATIENT
Start: 2023-04-20 | End: 2023-04-20 | Stop reason: HOSPADM

## 2023-04-20 RX ORDER — ONDANSETRON 2 MG/ML
4 INJECTION INTRAMUSCULAR; INTRAVENOUS EVERY 4 HOURS PRN
Status: DISCONTINUED | OUTPATIENT
Start: 2023-04-20 | End: 2023-04-20 | Stop reason: HOSPADM

## 2023-04-20 RX ORDER — BUPIVACAINE HYDROCHLORIDE 2.5 MG/ML
INJECTION, SOLUTION EPIDURAL; INFILTRATION; INTRACAUDAL AS NEEDED
Status: DISCONTINUED | OUTPATIENT
Start: 2023-04-20 | End: 2023-04-20 | Stop reason: HOSPADM

## 2023-04-20 RX ORDER — ONDANSETRON 2 MG/ML
4 INJECTION INTRAMUSCULAR; INTRAVENOUS ONCE AS NEEDED
Status: DISCONTINUED | OUTPATIENT
Start: 2023-04-20 | End: 2023-04-20 | Stop reason: HOSPADM

## 2023-04-20 RX ORDER — BACITRACIN, NEOMYCIN, POLYMYXIN B 400; 3.5; 5 [USP'U]/G; MG/G; [USP'U]/G
OINTMENT TOPICAL AS NEEDED
Status: DISCONTINUED | OUTPATIENT
Start: 2023-04-20 | End: 2023-04-20 | Stop reason: HOSPADM

## 2023-04-20 RX ADMIN — ACETAMINOPHEN 975 MG: 325 TABLET ORAL at 07:00

## 2023-04-20 RX ADMIN — HYDROMORPHONE HYDROCHLORIDE 0.5 MG: 1 INJECTION, SOLUTION INTRAMUSCULAR; INTRAVENOUS; SUBCUTANEOUS at 10:57

## 2023-04-20 RX ADMIN — OXYCODONE HYDROCHLORIDE 5 MG: 5 TABLET ORAL at 10:16

## 2023-04-20 NOTE — INTERVAL H&P NOTE
H&P reviewed  After examining the patient I find no changes in the patients condition since the H&P had been written      Vitals:    04/20/23 0656   BP: 152/84   Pulse: 77   Resp: 12   Temp: (!) 97 4 °F (36 3 °C)   SpO2: 98%

## 2023-04-20 NOTE — DISCHARGE INSTR - AVS FIRST PAGE
Please call the office when you leave to schedule an appointment to be seen in 2-3 weeks    Activity:    Do not lift more than 25 pounds for 4 weeks post-operatively                 Walking is encouraged  Normal daily activities including climbing steps are okay  Do not engage in strenuous activity or contact sports for 4-6 weeks post-operatively    Return to work:   Return to work to be discussed at first post-operative visit    Diet:   You may return to your normal heart healthy diet    Wound Care: Your wound is closed with skin glue  It is okay to shower  Wash incision gently with soap and water and pat dry  Do not soak incisions in bath water or swim for two weeks  Do not apply any creams or ointments  Ice as needed to incisions  May shower over wounds  Keep upper back wound covered with a dressing/Band-Aid or nylon stitches do not pull your clothing  Do this for the next several days  If wound is dry may leave open to air    Pain Medication:   Please take as directed  No driving while taking narcotic pain medications    Other:  If you have questions after discharge please call the office      If you have increased pain, fever >101 5, increased drainage, redness or a bad smell at your surgery site, please call us immediately or come directly to the Emergency Room

## 2023-04-20 NOTE — OP NOTE
OPERATIVE REPORT  PATIENT NAME: Yaz Adler IV    :  1960  MRN: [id number]  Pt Location: [facility] OR ROOM 01    SURGERY DATE: 2023    Surgeon(s) and Role:     * Allen Lee DO - Primary     * Nestor Galvan MD - Assisting    Preop Diagnosis:  Inguinal hernia [K40 90]  Mass on back [R22 2] x2    Post-Op Diagnosis Codes:     * Inguinal hernia [K40 90]     * Mass on back [R22 2] x2    Procedure(s):  Left - REPAIR HERNIA INGUINAL  Right - EXCISION  BIOPSY LESION/MASS BACK  right upper  x 2    Specimen(s):  ID Type Source Tests Collected by Time Destination   1 : RIGHT UPPER BACK CYSTS Tissue Cyst TISSUE EXAM Lisseth Perez DO 2023 0239        Estimated Blood Loss:   Minimal    Drains:  * No LDAs found *    Anesthesia Type:   General/local    Operative Indications:  Inguinal hernia [K40 90]  Mass on back [R22 2], upper back cyst      Operative Findings:  Large direct left inguinal hernia  2 upper back cyst   Each 3 cm in size  Removed with no specific margin  Height 73 inches weight 99 kg assuring 18 pounds  BMI 29  Complications:   None    Procedure and Technique:  Patient was brought in the operative suite and identified by visualization, conversation, by armband  Sequential compression pumps were placed  He was given Ancef perioperatively  Once under general anesthesia the left groin area was prepped and draped in a sterile fashion  Timeout was performed was assured that the prep was dry  Local was instilled over the left inguinal canal   An oblique skin incision was made and the subcutaneous tissues were divided with hot cautery down to the external bleak fascia  This fascia was opened up through the external ring to the level the internal ring  Darci retractor was placed for exposure  Large hernia emanated through the external ring into the scrotum  Cremasteric fibers were skeletonized off the cord structures which were then encircled with a Penrose drain    At this point it was clear this was a direct inguinal hernia  Spermatic cord was skeletonized and there is no signs of an indirect sac  I did score the transversalis tissues of the floor of the inguinal canal to open up the preperitoneal space of the direct inguinal hernia  All the contents were reduced with a laparotomy sponge to include tissues up above the internal ring  Inferior epigastric served  A medium on flex mesh was chosen and this was placed into the defect and spread out to cover the defect as well as the internal ring  Tails were anchored each side of the defect with 2-0 Vicryl  2-0 Vicryl was used to close transversalis tissues on top of the preperitoneal mesh  Onlay mesh was placed on the floor of the canal and anchored with 2-0 Vicryl to the pubic tubercle, shelving edge, and conjoined tendon  I then cut the onlay mesh from the superior aspect down to the internal ring  The resultant 2 tails were brought around the cord and neck into themselves as well as the underlying transversalis tissues with 2-0 Vicryl  Irrigations carried out  More local was instilled  External bleak fascia was reapproximated on top of the cord structures with a running 2-0 Vicryl suture  Roderick's was reapproximated in 2-0 Vicryl  Skin was closed using 4-0 Monocryl in a subcuticular fashion  Wound was washed and dried  Sterile skin glue was applied  Testicle was assured to be in the scrotum at the completion of the case  Next we turned the patient with his left side down to help expose the upper back cyst little more to his right side  The team changed gloves and the area was prepped and draped in a sterile fashion  A timeout was performed  Local was instilled in the skin surrounding tissues of both sizable cyst   Elliptical skin incision was made and the cyst were removed from the subcutaneous tissue with no specific margin  Once out hemostasis was assured  Irrigation was carried out    2-0 Vicryl was used to close skin in a simple fashion  4-0 nylon was used to close skin incisions of each wound  Wounds and washed and dried  Sterile dressings were applied  He was awakened in the operating room and returned to the recovery area in stable condition having tolerated the procedure well  I was present for the entire procedure      Patient Disposition:  PACU         SIGNATURE: Gela Rodriguez DO  DATE: April 20, 2023  TIME: 8:37 AM

## 2023-05-04 ENCOUNTER — OFFICE VISIT (OUTPATIENT)
Dept: SURGERY | Facility: CLINIC | Age: 63
End: 2023-05-04

## 2023-05-04 DIAGNOSIS — L72.0 EPIDERMAL INCLUSION CYST: ICD-10-CM

## 2023-05-04 DIAGNOSIS — K40.90 LEFT INGUINAL HERNIA: Primary | ICD-10-CM

## 2023-05-04 NOTE — PROGRESS NOTES
Assessment/Plan:    Diagnoses and all orders for this visit:    Left inguinal hernia    Epidermal inclusion cyst    Doing quite well status post left inguinal hernia repair as well as excision of upper back sebaceous cyst   Sutures removed from the cyst area  May resume unrestricted activity as of May 20    Pathology: Reviewed with patient, all questions answered  Postoperative restrictions reviewed  All questions answered  ______________________________________________________  HPI: Patient presents post operatively  Left inguinal hernia repair and excision biopsy lesion/mass right upper back x2 4/20/2023   Final Diagnosis  A  Right upper back cysts:  - Epidermal inclusion cysts  ROS:  General ROS: negative for - chills, fatigue, fever or night sweats, weight loss  Respiratory ROS: no cough, shortness of breath, or wheezing  Cardiovascular ROS: no chest pain or dyspnea on exertion  Genito-Urinary ROS: no dysuria, trouble voiding, or hematuria  Musculoskeletal ROS: negative for - gait disturbance, joint pain or muscle pain  Neurological ROS: no TIA or stroke symptoms  GI ROS: see HPI  Skin ROS: no new rashes or lesions   Lymphatic ROS: no new adenopathy noted by pt  GYN ROS: see HPI, no new GYN history or bleeding noted  Psy ROS: no new mental or behavioral disturbances         Patient Active Problem List   Diagnosis    Left inguinal hernia    Epidermal inclusion cyst       Allergies:  Patient has no known allergies        Current Outpatient Medications:     Ascorbic Acid (vitamin C) 1000 MG tablet, Take 1,000 mg by mouth daily, Disp: , Rfl:     Cholecalciferol (VITAMIN D3 PO), Take 2,000 Units by mouth, Disp: , Rfl:     Coenzyme Q10 (Co Q 10) 10 MG CAPS, Take by mouth, Disp: , Rfl:     losartan (COZAAR) 25 mg tablet, Take 1 tablet (25 mg total) by mouth daily, Disp: 90 tablet, Rfl: 1    LYSINE PO, Take 1,000 mg by mouth, Disp: , Rfl:     Magnesium Oxide (MAG-CAPS PO), Take 400 mg by mouth, Disp: , Rfl:     Multiple Vitamins-Minerals (ONE-A-DAY 50 PLUS PO), Take by mouth, Disp: , Rfl:     Past Medical History:   Diagnosis Date    Hypertension        Past Surgical History:   Procedure Laterality Date    TX EXCISION TUMOR SOFT TIS BACK/FLANK SUBQ 3 CM/> Right 4/20/2023    Procedure: EXCISION  BIOPSY LESION/MASS BACK, right upper, x 2;  Surgeon: Hailey Bentoning Conron, DO;  Location: [facility];  Service: General    TX RPR 1ST INGUN HRNA AGE 5 YRS/> REDUCIBLE Left 4/20/2023    Procedure: REPAIR HERNIA INGUINAL;  Surgeon: Hailey Denson [facility];  Location: [facility];  Service: General    TONSILLECTOMY         Family History   Problem Relation Age of Onset    Breast cancer Mother     Liver cancer Paternal Grandmother         reports that he quit smoking about 13 years ago  His smoking use included cigarettes  He has never used smokeless tobacco  He reports current alcohol use of about 1 0 standard drink per week  He reports that he does not use drugs  PHYSICAL EXAM    There were no vitals taken for this visit  General: normal, cooperative, no distress  Abdominal: soft, nondistended or nontender  Incision: clean, dry, and intact and healing well  Incisions clean and dry of the upper back    Sutures removed      Andrea Lima DO    Date: 5/4/2023 Time: 1:10 PM

## 2023-06-13 ENCOUNTER — OFFICE VISIT (OUTPATIENT)
Dept: FAMILY MEDICINE CLINIC | Facility: CLINIC | Age: 63
End: 2023-06-13
Payer: COMMERCIAL

## 2023-06-13 VITALS
TEMPERATURE: 96.2 F | HEART RATE: 67 BPM | OXYGEN SATURATION: 98 % | BODY MASS INDEX: 29.77 KG/M2 | SYSTOLIC BLOOD PRESSURE: 122 MMHG | WEIGHT: 219.8 LBS | DIASTOLIC BLOOD PRESSURE: 80 MMHG | HEIGHT: 72 IN | RESPIRATION RATE: 14 BRPM

## 2023-06-13 DIAGNOSIS — I10 PRIMARY HYPERTENSION: Primary | ICD-10-CM

## 2023-06-13 DIAGNOSIS — K40.90 LEFT INGUINAL HERNIA: ICD-10-CM

## 2023-06-13 DIAGNOSIS — E55.9 VITAMIN D DEFICIENCY: ICD-10-CM

## 2023-06-13 DIAGNOSIS — E78.5 MILD HYPERLIPIDEMIA: ICD-10-CM

## 2023-06-13 PROCEDURE — 99213 OFFICE O/P EST LOW 20 MIN: CPT | Performed by: FAMILY MEDICINE

## 2023-06-13 NOTE — PROGRESS NOTES
FAMILY PRACTICE OFFICE VISIT       NAME: Ravinder Freire IV  AGE: 60 y o  SEX: male       : 1960        MRN: [id number]    DATE: 2023  TIME: 10:03 AM    Assessment and Plan   1  Primary hypertension  Comments:  Controlled on present management  Manuela Interiano is to continue a lower carb/salt diet, and remain active  2  Mild hyperlipidemia  Comments:  Hx of - pt has repeat FBW to complete  3  Vitamin D deficiency  Comments:  Stable on his OTC Vit D3 supplement  4  Left inguinal hernia  Comments:  Pt recovering nicely from recent surgery with Dr Chris Larson (General Surgery)  There are no Patient Instructions on file for this visit  Chief Complaint     Chief Complaint   Patient presents with   • Follow-up     Patient being seen for 3 month follow up        History of Present Illness   Kristi Forrester is a 60y o -year-old male who presents in f/u  Has recovered nicely from left inguinal hernia repair  He is tolerating the Losartan well; cough completely resolved within a week of stopping Lisinopril  He is just starting to get more active again after recovering from surgery  Review of Systems   Review of Systems   Constitutional: Negative for activity change  Respiratory: Negative for cough and shortness of breath  Cardiovascular: Negative for chest pain  Gastrointestinal: Negative for abdominal pain         Active Problem List     Patient Active Problem List   Diagnosis   • Left inguinal hernia   • Epidermal inclusion cyst         Past Medical History:  Past Medical History:   Diagnosis Date   • Hypertension        Past Surgical History:  Past Surgical History:   Procedure Laterality Date   • ME EXCISION TUMOR SOFT TIS BACK/FLANK SUBQ 3 CM/> Right 2023    Procedure: EXCISION  BIOPSY LESION/MASS BACK, right upper, x 2;  Surgeon: Brooklynn Ojeda DO;  Location: [facility] MAIN OR;  Service: General   • ME RPR 1ST INGUN HRNA AGE 5 YRS/> REDUCIBLE Left 2023    Procedure: REPAIR HERNIA INGUINAL;  Surgeon: Wanda Ojeda DO;  Location: [facility] MAIN [facility];  Service: General   • TONSILLECTOMY         Family History:  Family History   Problem Relation Age of Onset   • Breast cancer Mother    • Liver cancer Paternal Grandmother        Social History:  Social History     Socioeconomic History   • Marital status: /Civil Union     Spouse name: Not on file   • Number of children: Not on file   • Years of education: Not on file   • Highest education level: Not on file   Occupational History   • Not on file   Tobacco Use   • Smoking status: Former     Types: Cigarettes     Quit date:      Years since quittin 4   • Smokeless tobacco: Never   Vaping Use   • Vaping Use: Never used   Substance and Sexual Activity   • Alcohol use: Yes     Alcohol/week: 1 0 standard drink of alcohol     Types: 1 Cans of beer per week     Comment: 1-2 beers weekly   • Drug use: Never   • Sexual activity: Not on file   Other Topics Concern   • Not on file   Social History Narrative   • Not on file     Social Determinants of Health     Financial Resource Strain: Not on file   Food Insecurity: Not on file   Transportation Needs: Not on file   Physical Activity: Not on file   Stress: Not on file   Social Connections: Not on file   Intimate Partner Violence: Not on file   Housing Stability: Not on file       Objective     Vitals:    23 0939   BP: 122/80   Pulse: 67   Resp: 14   Temp: (!) 96 2 °F (35 7 °C)   SpO2: 98%     Wt Readings from Last 3 Encounters:   23 99 7 kg (219 lb 12 8 oz)   23 99 kg (218 lb 3 2 oz)   23 97 5 kg (215 lb)       Physical Exam  Vitals and nursing note reviewed  Constitutional:       General: He is not in acute distress  Appearance: Normal appearance  He is not ill-appearing, toxic-appearing or diaphoretic  HENT:      Head: Normocephalic and atraumatic  Eyes:      General: No scleral icterus       Conjunctiva/sclera: Conjunctivae normal  "  Cardiovascular:      Rate and Rhythm: Normal rate and regular rhythm  Heart sounds: Normal heart sounds  No murmur heard  No friction rub  No gallop  Pulmonary:      Effort: Pulmonary effort is normal  No respiratory distress  Breath sounds: Normal breath sounds  No stridor  No wheezing, rhonchi or rales  Musculoskeletal:      Cervical back: Normal range of motion and neck supple  No rigidity or tenderness  Lymphadenopathy:      Cervical: No cervical adenopathy  Neurological:      Mental Status: He is alert and oriented to person, place, and time  Psychiatric:         Mood and Affect: Mood normal          Behavior: Behavior normal          Thought Content:  Thought content normal          Judgment: Judgment normal          Pertinent Laboratory/Diagnostic Studies:  Lab Results   Component Value Date    BUN 19 03/30/2023    CALCIUM 9 0 03/30/2023     03/30/2023    CO2 29 03/30/2023    CREATININE 1 05 03/30/2023    K 4 1 03/30/2023     Lab Results   Component Value Date    ALKPHOS 51 02/17/2023    ALT 35 02/17/2023    AST 16 02/17/2023       Lab Results   Component Value Date    HCT 45 5 03/30/2023    HGB 14 8 03/30/2023    MCV 96 03/30/2023     03/30/2023    WBC 8 55 03/30/2023       No results found for: \"TSH\"    No results found for: \"CHOL\"  Lab Results   Component Value Date    TRIG 139 02/17/2023     Lab Results   Component Value Date    HDL 43 02/17/2023     Lab Results   Component Value Date    LDLCALC 125 (H) 02/17/2023     No results found for: \"HGBA1C\"    Results for orders placed or performed during the hospital encounter of 04/20/23   Tissue Exam   Result Value Ref Range    Case Report       Surgical Pathology Report                         Case: [id number]                                   Authorizing Provider:  Kendrick Hirsch DO   Collected:           04/20/2023 5081              Ordering Location:     [facility]        Received:            04/20/2023 " "[address] [facility]                                                    Pathologist:           Muriel Johnson MD                                                                 Specimen:    Cyst, RIGHT UPPER BACK CYSTS                                                               Final Diagnosis       A  Right upper back cysts:  - Epidermal inclusion cysts  Additional Information       All reported additional testing was performed with appropriately reactive controls  These tests were developed and their performance characteristics determined by [facility] or appropriate performing facility, though some tests may be performed on tissues which have not been validated for performance characteristics (such as staining performed on alcohol exposed cell blocks and decalcified tissues)  Results should be interpreted with caution and in the context of the patients’ clinical condition  These tests may not be cleared or approved by the [place]  Food and Drug Administration, though the FDA has determined that such clearance or approval is not necessary  These tests are used for clinical purposes and they should not be regarded as investigational or for research  This laboratory has been approved by CLIA 88, designated as a high-complexity laboratory and is qualified to perform these tests  Michael Stewart Description       A  The specimen is received in formalin, labeled with the patient's name and hospital number, and is designated \"Right upper back cysts\"  The specimen consists of multiple tan-yellow fibromembranous and fibrofatty tissue fragments with caseous material identified measuring in aggregate of 6 7 x 4 4 x 2 2 cm    The epithelial surfaces appear wrinkly and focally keratotic with a single invagination measuring 0 3 cm in diameter, but otherwise grossly " unremarkable  The largest tissue fragments are serially sectioned revealing tan-brown, dense caseous and fibromembranous appearing cut surfaces  Representative sections  Two cassettes  Note: The estimated total formalin fixation time based upon information provided by the submitting clinician and the standard processing schedule is under 72 hours  -Brook Reynolds         No orders of the defined types were placed in this encounter  ALLERGIES:  No Known Allergies    Current Medications     Current Outpatient Medications   Medication Sig Dispense Refill   • Ascorbic Acid (vitamin C) 1000 MG tablet Take 1,000 mg by mouth daily     • Cholecalciferol (VITAMIN D3 PO) Take 2,000 Units by mouth     • Coenzyme Q10 (Co Q 10) 10 MG CAPS Take by mouth     • losartan (COZAAR) 25 mg tablet Take 1 tablet (25 mg total) by mouth daily 90 tablet 1   • LYSINE PO Take 1,000 mg by mouth     • Magnesium Oxide (MAG-CAPS PO) Take 400 mg by mouth     • Multiple Vitamins-Minerals (ONE-A-DAY 50 PLUS PO) Take by mouth       No current facility-administered medications for this visit           Health Maintenance     Health Maintenance   Topic Date Due   • Hepatitis C Screening  Never done   • HIV Screening  Never done   • Colorectal Cancer Screening  Never done   • COVID-19 Vaccine (4 - Pfizer series) 03/01/2022   • Depression Screening  01/11/2024   • BMI: Followup Plan  01/11/2024   • Annual Physical  01/11/2024   • BMI: Adult  06/13/2024   • DTaP,Tdap,and Td Vaccines (2 - Td or Tdap) 12/28/2025   • Influenza Vaccine  Completed   • Pneumococcal Vaccine: Pediatrics (0 to 5 Years) and At-Risk Patients (6 to 59 Years)  Aged Out   • HIB Vaccine  Aged Out   • IPV Vaccine  Aged Out   • Hepatitis A Vaccine  Aged Out   • Meningococcal ACWY Vaccine  Aged Out   • HPV Vaccine  Aged [employer] History   Administered Date(s) Administered   • COVID-19 PFIZER VACCINE 0 3 ML IM 03/10/2021, 04/01/2021, 01/04/2022   • INFLUENZA 12/28/2015 • Influenza, recombinant, quadrivalent,injectable, preservative free 01/11/2023   • Tdap 12/28/2015          Jose Tucker DO

## 2023-09-05 DIAGNOSIS — I10 PRIMARY HYPERTENSION: ICD-10-CM

## 2023-09-05 RX ORDER — LOSARTAN POTASSIUM 25 MG/1
25 TABLET ORAL DAILY
Qty: 90 TABLET | Refills: 1 | Status: SHIPPED | OUTPATIENT
Start: 2023-09-05

## 2023-09-05 NOTE — TELEPHONE ENCOUNTER
Medication:losartan (COZAAR) 25 mg tablet  Dosage:  How Often:Take 1 tablet (25 mg total) by mouth daily  Quantity: 90  Last Office Visit:6/13/2023  Next Office Visit:12/15/2023  Last refilled:3/10/2023  How many pills left:2  Pharmacy:[facility]/pharmacy #[id number]- [place] - [id number]

## 2024-01-05 ENCOUNTER — OFFICE VISIT (OUTPATIENT)
Dept: FAMILY MEDICINE CLINIC | Facility: CLINIC | Age: 64
End: 2024-01-05
Payer: COMMERCIAL

## 2024-01-05 VITALS
DIASTOLIC BLOOD PRESSURE: 88 MMHG | BODY MASS INDEX: 30.42 KG/M2 | TEMPERATURE: 95.3 F | HEART RATE: 74 BPM | RESPIRATION RATE: 16 BRPM | HEIGHT: 72 IN | WEIGHT: 224.6 LBS | SYSTOLIC BLOOD PRESSURE: 140 MMHG | OXYGEN SATURATION: 97 %

## 2024-01-05 DIAGNOSIS — Z12.11 COLON CANCER SCREENING: Primary | ICD-10-CM

## 2024-01-05 DIAGNOSIS — I10 PRIMARY HYPERTENSION: ICD-10-CM

## 2024-01-05 DIAGNOSIS — Z11.59 ENCOUNTER FOR HEPATITIS C SCREENING TEST FOR LOW RISK PATIENT: ICD-10-CM

## 2024-01-05 DIAGNOSIS — H61.21 IMPACTED CERUMEN OF RIGHT EAR: ICD-10-CM

## 2024-01-05 DIAGNOSIS — Z13.6 ENCOUNTER FOR SCREENING FOR CARDIOVASCULAR DISORDERS: ICD-10-CM

## 2024-01-05 DIAGNOSIS — L98.9 NON-HEALING SKIN LESION: ICD-10-CM

## 2024-01-05 DIAGNOSIS — Z28.21 INFLUENZA VACCINATION DECLINED: ICD-10-CM

## 2024-01-05 PROCEDURE — 99214 OFFICE O/P EST MOD 30 MIN: CPT | Performed by: FAMILY MEDICINE

## 2024-01-05 NOTE — PROGRESS NOTES
Name: Remy Edouard IV      : 1960      MRN: [id number]  Encounter Provider: Alexy Arnold MD  Encounter Date: 2024   Encounter department: [facility]    Assessment & Plan     1. Colon cancer screening  Comments:  Has not been screening. Reviewed screening options. Opted for cologuard  Orders:  -     Cologuard    2. Encounter for screening for cardiovascular disorders  Comments:  FBW ordered  Orders:  -     Lipid panel; Future  -     Comprehensive metabolic panel; Future    3. Primary hypertension  Comments:  BP mildly elevated on Losartan 25 mg daily. Repeat BP also elevated. Will recheck pressure when he returns for ear lavage next week  Orders:  -     Lipid panel; Future  -     Comprehensive metabolic panel; Future    4. Encounter for hepatitis C screening test for low risk patient  -     Hepatitis C antibody; Future    5. Non-healing skin lesion  Comments:  Lesion on the base of the neck near the clavical for several months. Keratotic lesion. Possibly AK. Referred to dermatology  Orders:  -     Ambulatory Referral to Dermatology; Future    6. Influenza vaccination declined    7. Impacted cerumen of right ear  Comments:  Right ear completed impacted. Will return next week for ear lavage         Subjective      New to me. Saw Dr. Tucker in the past   Has a non healing skin lesion on the chest/neck area  Non pruritic. Hasn't bled and grown in size   Made dietary change and lost 30 lbs.   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 Did completed the primary series  Fatigue improved greatly with CoQ 10       Review of Systems    Current Outpatient Medications on File Prior to Visit   Medication Sig   • Cholecalciferol (VITAMIN D3 PO) Take 2,000 Units by mouth   • Coenzyme Q10 (Co Q 10) 10 MG CAPS Take by mouth   • losartan (COZAAR)  "25 mg tablet Take 1 tablet (25 mg total) by mouth daily   • LYSINE PO Take 1,000 mg by mouth   • Magnesium Oxide (MAG-CAPS PO) Take 400 mg by mouth   • Multiple Vitamins-Minerals (ONE-A-DAY 50 PLUS PO) Take by mouth   • Ascorbic Acid (vitamin C) 1000 MG tablet Take 1,000 mg by mouth daily (Patient not taking: Reported on 1/5/2024)       Objective     /88 (BP Location: Right arm, Patient Position: Sitting, Cuff Size: Large)   Pulse 74   Temp (!) 95.3 °F (35.2 °C) (Tympanic)   Resp 16   Ht 6' 0.44\" (1.84 m)   Wt 102 kg (224 lb 9.6 oz)   SpO2 97%   BMI 30.09 kg/m²     Physical Exam  Constitutional:       General: He is not in acute distress.     Appearance: Normal appearance. He is not ill-appearing or toxic-appearing.   HENT:      Head: Normocephalic.      Right Ear: There is impacted cerumen.      Left Ear: Tympanic membrane normal. There is no impacted cerumen.      Mouth/Throat:      Pharynx: No posterior oropharyngeal erythema.   Eyes:      General:         Right eye: No discharge.         Left eye: No discharge.      Extraocular Movements: Extraocular movements intact.   Cardiovascular:      Rate and Rhythm: Normal rate and regular rhythm.      Heart sounds: No murmur heard.  Pulmonary:      Effort: Pulmonary effort is normal. No respiratory distress.      Breath sounds: Normal breath sounds. No stridor. No wheezing or rales.   Abdominal:      General: There is no distension.      Palpations: Abdomen is soft. There is no mass.      Tenderness: There is no abdominal tenderness. There is no guarding or rebound.      Hernia: No hernia is present.   Musculoskeletal:      Right lower leg: Edema (trace pretibial edema) present.      Left lower leg: No edema.   Lymphadenopathy:      Cervical: No cervical adenopathy.   Skin:     General: Skin is warm.      Findings: Lesion and rash present. Rash is crusting and scaling.          Neurological:      Mental Status: He is alert and oriented to person, place, and " time.       Alexy Arnold MD

## 2024-01-26 ENCOUNTER — OFFICE VISIT (OUTPATIENT)
Dept: FAMILY MEDICINE CLINIC | Facility: CLINIC | Age: 64
End: 2024-01-26
Payer: COMMERCIAL

## 2024-01-26 VITALS
TEMPERATURE: 97.2 F | HEIGHT: 72 IN | OXYGEN SATURATION: 97 % | SYSTOLIC BLOOD PRESSURE: 138 MMHG | WEIGHT: 224.6 LBS | RESPIRATION RATE: 16 BRPM | BODY MASS INDEX: 30.42 KG/M2 | HEART RATE: 86 BPM | DIASTOLIC BLOOD PRESSURE: 82 MMHG

## 2024-01-26 DIAGNOSIS — H61.21 IMPACTED CERUMEN OF RIGHT EAR: Primary | ICD-10-CM

## 2024-01-26 PROCEDURE — 99213 OFFICE O/P EST LOW 20 MIN: CPT | Performed by: FAMILY MEDICINE

## 2024-01-26 PROCEDURE — 69210 REMOVE IMPACTED EAR WAX UNI: CPT | Performed by: FAMILY MEDICINE

## 2024-01-26 NOTE — PROGRESS NOTES
Ear cerumen removal    Date/Time: 1/26/2024 1:30 PM    Performed by: Alexy Arnold MD  Authorized by: Alexy Arnold MD  [place] Protocol:  Consent: Verbal consent obtained.  Consent given by: patient  Timeout called at: 1/26/2024 1:53 PM.  Patient understanding: patient states understanding of the procedure being performed    Patient location:  Clinic  Procedure details:     Local anesthetic:  None    Location:  R ear    Procedure type: irrigation with instrumentation      Instrumentation: curette      Approach:  External  Post-procedure details:     Complication:  None    Hearing quality:  Normal    Patient tolerance of procedure:  Tolerated well, no immediate complications  Comments:      Pt also reported a viral illness last week with chills and cough. Sx have improved and is feeling better. Took mucinex. Had 1 fever. Bp initially elevated but improved to 138/82. Will continue to monitor. Lungs clear to auscultation

## 2024-02-29 DIAGNOSIS — I10 PRIMARY HYPERTENSION: ICD-10-CM

## 2024-02-29 RX ORDER — LOSARTAN POTASSIUM 25 MG/1
25 TABLET ORAL DAILY
Qty: 90 TABLET | Refills: 1 | Status: SHIPPED | OUTPATIENT
Start: 2024-02-29

## 2024-03-29 ENCOUNTER — TELEPHONE (OUTPATIENT)
Age: 64
End: 2024-03-29

## 2024-03-29 NOTE — TELEPHONE ENCOUNTER
Called and left message for patient to return call to office regarding scheduling, if needed.    Deferred referral.

## 2024-07-24 ENCOUNTER — APPOINTMENT (OUTPATIENT)
Dept: LAB | Age: 64
End: 2024-07-24
Payer: COMMERCIAL

## 2024-07-24 DIAGNOSIS — I10 PRIMARY HYPERTENSION: ICD-10-CM

## 2024-07-24 DIAGNOSIS — Z13.6 ENCOUNTER FOR SCREENING FOR CARDIOVASCULAR DISORDERS: ICD-10-CM

## 2024-07-24 DIAGNOSIS — Z11.59 ENCOUNTER FOR HEPATITIS C SCREENING TEST FOR LOW RISK PATIENT: ICD-10-CM

## 2024-07-24 LAB
ALBUMIN SERPL BCG-MCNC: 3.7 G/DL (ref 3.5–5)
ALP SERPL-CCNC: 54 U/L (ref 34–104)
ALT SERPL W P-5'-P-CCNC: 21 U/L (ref 7–52)
ANION GAP SERPL CALCULATED.3IONS-SCNC: 9 MMOL/L (ref 4–13)
AST SERPL W P-5'-P-CCNC: 17 U/L (ref 13–39)
BILIRUB SERPL-MCNC: 0.98 MG/DL (ref 0.2–1)
BUN SERPL-MCNC: 14 MG/DL (ref 5–25)
CALCIUM SERPL-MCNC: 8.4 MG/DL (ref 8.4–10.2)
CHLORIDE SERPL-SCNC: 105 MMOL/L (ref 96–108)
CHOLEST SERPL-MCNC: 202 MG/DL
CO2 SERPL-SCNC: 28 MMOL/L (ref 21–32)
CREAT SERPL-MCNC: 0.81 MG/DL (ref 0.6–1.3)
GFR SERPL CREATININE-BSD FRML MDRD: 93 ML/MIN/1.73SQ M
GLUCOSE P FAST SERPL-MCNC: 84 MG/DL (ref 65–99)
HCV AB SER QL: NORMAL
HDLC SERPL-MCNC: 49 MG/DL
LDLC SERPL CALC-MCNC: 129 MG/DL (ref 0–100)
NONHDLC SERPL-MCNC: 153 MG/DL
POTASSIUM SERPL-SCNC: 4.5 MMOL/L (ref 3.5–5.3)
PROT SERPL-MCNC: 6.8 G/DL (ref 6.4–8.4)
SODIUM SERPL-SCNC: 142 MMOL/L (ref 135–147)
TRIGL SERPL-MCNC: 119 MG/DL

## 2024-07-24 PROCEDURE — 80061 LIPID PANEL: CPT

## 2024-07-24 PROCEDURE — 36415 COLL VENOUS BLD VENIPUNCTURE: CPT

## 2024-07-24 PROCEDURE — 80053 COMPREHEN METABOLIC PANEL: CPT

## 2024-07-24 PROCEDURE — 86803 HEPATITIS C AB TEST: CPT

## 2024-07-29 ENCOUNTER — OFFICE VISIT (OUTPATIENT)
Dept: FAMILY MEDICINE CLINIC | Facility: CLINIC | Age: 64
End: 2024-07-29
Payer: COMMERCIAL

## 2024-07-29 VITALS
SYSTOLIC BLOOD PRESSURE: 140 MMHG | TEMPERATURE: 97 F | HEIGHT: 73 IN | BODY MASS INDEX: 28.76 KG/M2 | HEART RATE: 79 BPM | OXYGEN SATURATION: 99 % | RESPIRATION RATE: 18 BRPM | WEIGHT: 217 LBS | DIASTOLIC BLOOD PRESSURE: 100 MMHG

## 2024-07-29 DIAGNOSIS — Z12.11 COLON CANCER SCREENING: ICD-10-CM

## 2024-07-29 DIAGNOSIS — K12.0 CANKER SORES ORAL: ICD-10-CM

## 2024-07-29 DIAGNOSIS — E78.5 MILD HYPERLIPIDEMIA: ICD-10-CM

## 2024-07-29 DIAGNOSIS — Z00.00 ANNUAL PHYSICAL EXAM: Primary | ICD-10-CM

## 2024-07-29 PROCEDURE — 3725F SCREEN DEPRESSION PERFORMED: CPT | Performed by: FAMILY MEDICINE

## 2024-07-29 PROCEDURE — 99397 PER PM REEVAL EST PAT 65+ YR: CPT | Performed by: FAMILY MEDICINE

## 2024-07-29 PROCEDURE — 3288F FALL RISK ASSESSMENT DOCD: CPT | Performed by: FAMILY MEDICINE

## 2024-07-29 PROCEDURE — 1101F PT FALLS ASSESS-DOCD LE1/YR: CPT | Performed by: FAMILY MEDICINE

## 2024-07-29 RX ORDER — ACYCLOVIR 50 MG/G
OINTMENT TOPICAL EVERY 4 HOURS
Qty: 15 G | Refills: 1 | Status: SHIPPED | OUTPATIENT
Start: 2024-07-29

## 2024-07-29 NOTE — PROGRESS NOTES
Adult Annual Physical  Name: Remy Edouard IV      : 1960      MRN: [id number]  Encounter Provider: Alexy Arnold MD  Encounter Date: 2024   Encounter department: [facility]    Assessment & Plan   1. Annual physical exam  Comments:  Annual completed. Pt reminded to do cologuard. Cut back significantly on the drinking. Reccomend low cholesterol diet and continue walking  2. Mild hyperlipidemia  Assessment & Plan:  Lab Results   Component Value Date    CHOLESTEROL 202 (H) 2024    CHOLESTEROL 196 2023     Lab Results   Component Value Date    HDL 49 2024    HDL 43 2023     Lab Results   Component Value Date    TRIG 119 2024    TRIG 139 2023     Lab Results   Component Value Date    NONHDLC 153 2024     We reviewed recent labs. ASCVD 17%  We discussed the indications for statin therapy and based on his history, would warrant statin  I suggest CT calcium score to further evaluate ASCVD risk  Pt prefer to may further changes to his diet and increase his exercise  Will recheck in 6 months  Monitor blood pressures at home and bring machine with him to the next appt  Orders:  -     Lipid panel; Future; Expected date: 2025  3. Colon cancer screening  Comments:  Cologuard ordered earlier this year but not completed. Pt reminded to do cologuard  4. Canker sores oral  Comments:  History of recurrent canker sores. Currently using abreva. Plays an instrument daily while irritates it. Acyclovir ointment ordered to help speed recovery.  Orders:  -     acyclovir (ZOVIRAX) 5 % ointment; Apply topically every 4 (four) hours    Immunizations and preventive care screenings were discussed with patient today. Appropriate education was printed on patient's after visit summary.    Counseling:  Alcohol/drug use: discussed moderation in alcohol intake, the recommendations for healthy alcohol use, and avoidance of illicit drug use.  Dental Health: discussed  importance of regular tooth brushing, flossing, and dental visits.  Exercise: the importance of regular exercise/physical activity was discussed. Recommend exercise 3-5 times per week for at least 30 minutes.          History of Present Illness     Adult Annual Physical:  Patient presents for annual physical. Devloped canker sore  Chronic   Applying abreva   Had covid symptoms around Father's day ( fever, fatigue, anosmia)   Resolved with rest   .     Diet and Physical Activity:  - Diet/Nutrition: well balanced diet.  - Exercise: walking. walking a mile or so after lunch    Depression Screening:  - PHQ-2 Score: 0    General Health:  - Sleep: sleeps well. Drinking less  - Hearing: normal hearing bilateral ears.  - Vision: wears glasses and most recent eye exam < 1 year ago. Seen earlier  - Dental:. years ago    Review of Systems  Past Medical History   Past Medical History:   Diagnosis Date    Hypertension      Past Surgical History:   Procedure Laterality Date    [place] EXCISION TUMOR SOFT TIS BACK/FLANK SUBQ 3 CM/> Right 4/20/2023    Procedure: EXCISION  BIOPSY LESION/MASS BACK, right upper, x 2;  Surgeon: Devante Ojeda DO;  [facility]: [facility] MAIN OR;  Service: General    WV RPR 1ST INGUN HRNA AGE 4 YRS/> REDUCIBLE Left 4/20/2023    Procedure: REPAIR HERNIA INGUINAL;  Surgeon: Devante Ojeda DO;  Location: [facility] MAIN OR;  Service: General    TONSILLECTOMY       Family History   Problem Relation Age of Onset    Breast cancer Mother     Lung cancer Mother     No Known Problems Father     Liver cancer Paternal Grandmother      Current Outpatient Medications on File Prior to Visit   Medication Sig Dispense Refill    Cholecalciferol (VITAMIN D3 PO) Take 2,000 Units by mouth      Coenzyme Q10 (Co Q 10) 10 MG CAPS Take by mouth      losartan (COZAAR) 25 mg tablet TAKE 1 TABLET (25 MG TOTAL) BY MOUTH DAILY. 90 tablet 1    LYSINE PO Take 1,000 mg by mouth      Magnesium Oxide (MAG-CAPS PO) Take 400 mg by mouth    "   Multiple Vitamins-Minerals (ONE-A-DAY 50 PLUS PO) Take by mouth      Ascorbic Acid (vitamin C) 1000 MG tablet Take 1,000 mg by mouth daily (Patient not taking: Reported on 2024)       No current facility-administered medications on file prior to visit.   No Known Allergies   Current Outpatient Medications on File Prior to Visit   Medication Sig Dispense Refill    Cholecalciferol (VITAMIN D3 PO) Take 2,000 Units by mouth      Coenzyme Q10 (Co Q 10) 10 MG CAPS Take by mouth      losartan (COZAAR) 25 mg tablet TAKE 1 TABLET (25 MG TOTAL) BY MOUTH DAILY. 90 tablet 1    LYSINE PO Take 1,000 mg by mouth      Magnesium Oxide (MAG-CAPS PO) Take 400 mg by mouth      Multiple Vitamins-Minerals (ONE-A-DAY 50 PLUS PO) Take by mouth      Ascorbic Acid (vitamin C) 1000 MG tablet Take 1,000 mg by mouth daily (Patient not taking: Reported on 2024)       No current facility-administered medications on file prior to visit.      Social History     Tobacco Use    Smoking status: Former     Current packs/day: 0.00     Types: Cigarettes     Quit date:      Years since quittin.5    Smokeless tobacco: Never   Vaping Use    Vaping status: Never Used   Substance and Sexual Activity    Alcohol use: Yes     Alcohol/week: 1.0 standard drink of alcohol     Types: 1 Cans of beer per week     Comment: 1-2 beers weekly    Drug use: Never    Sexual activity: Yes     Partners: Female       Objective     /100 (BP Location: Left arm, Patient Position: Sitting, Cuff Size: Standard)   Pulse 79   Temp (!) 97 °F (36.1 °C) (Tympanic)   Resp 18   Ht 6' 1\" (1.854 m)   Wt 98.4 kg (217 lb)   SpO2 99%   BMI 28.63 kg/m²     Physical Exam  Constitutional:       General: He is not in acute distress.     Appearance: Normal appearance. He is not ill-appearing.   HENT:      Head: Normocephalic and atraumatic.      Right Ear: Tympanic membrane normal.      Left Ear: Tympanic membrane normal.      Mouth/Throat:      Lips: Lesions " present.      Mouth: Mucous membranes are moist.        Comments: Canker sore  Eyes:      Extraocular Movements: Extraocular movements intact.   Cardiovascular:      Rate and Rhythm: Normal rate and regular rhythm.      Heart sounds: No murmur heard.  Pulmonary:      Effort: Pulmonary effort is normal. No respiratory distress.      Breath sounds: Normal breath sounds. No stridor. No wheezing or rales.   Abdominal:      General: There is no distension.      Palpations: Abdomen is soft. There is no mass.      Tenderness: There is no abdominal tenderness. There is no guarding or rebound.      Hernia: No hernia is present.   Musculoskeletal:      Right lower leg: Edema (trace pitting) present.      Left lower leg: Edema (trace) present.   Lymphadenopathy:      Cervical: No cervical adenopathy.   Neurological:      Mental Status: He is alert and oriented to person, place, and time.   Psychiatric:         Mood and Affect: Mood normal.         Behavior: Behavior normal.

## 2024-07-29 NOTE — ASSESSMENT & PLAN NOTE
Lab Results   Component Value Date    CHOLESTEROL 202 (H) 07/24/2024    CHOLESTEROL 196 02/17/2023     Lab Results   Component Value Date    HDL 49 07/24/2024    HDL 43 02/17/2023     Lab Results   Component Value Date    TRIG 119 07/24/2024    TRIG 139 02/17/2023     Lab Results   Component Value Date    NONHDLC 153 07/24/2024     We reviewed recent labs. ASCVD 17%  We discussed the indications for statin therapy and based on his history, would warrant statin  I suggest CT calcium score to further evaluate ASCVD risk  Pt prefer to may further changes to his diet and increase his exercise  Will recheck in 6 months  Monitor blood pressures at home and bring machine with him to the next appt

## 2024-07-29 NOTE — PATIENT INSTRUCTIONS
Dr. Jimenez, [facility]    [address], [place], [facility] [phone]

## 2024-08-29 DIAGNOSIS — I10 PRIMARY HYPERTENSION: ICD-10-CM

## 2024-08-29 RX ORDER — LOSARTAN POTASSIUM 25 MG/1
25 TABLET ORAL DAILY
Qty: 90 TABLET | Refills: 1 | Status: SHIPPED | OUTPATIENT
Start: 2024-08-29

## 2025-01-03 ENCOUNTER — TELEPHONE (OUTPATIENT)
Age: 65
End: 2025-01-03

## 2025-01-03 NOTE — TELEPHONE ENCOUNTER
Received call from Patient asking to confirm 1/17/25 3:00 Dr. Joselo Rodriguez. Confirmed appt. Verified insurance, provided [place] address.     Patient verbalized understanding.

## 2025-01-16 ENCOUNTER — OFFICE VISIT (OUTPATIENT)
Dept: FAMILY MEDICINE CLINIC | Facility: CLINIC | Age: 65
End: 2025-01-16
Payer: COMMERCIAL

## 2025-01-16 VITALS
SYSTOLIC BLOOD PRESSURE: 150 MMHG | TEMPERATURE: 95.7 F | WEIGHT: 225.4 LBS | DIASTOLIC BLOOD PRESSURE: 90 MMHG | HEIGHT: 73 IN | RESPIRATION RATE: 16 BRPM | BODY MASS INDEX: 29.87 KG/M2 | OXYGEN SATURATION: 98 % | HEART RATE: 85 BPM

## 2025-01-16 DIAGNOSIS — I10 PRIMARY HYPERTENSION: ICD-10-CM

## 2025-01-16 DIAGNOSIS — E78.5 MILD HYPERLIPIDEMIA: ICD-10-CM

## 2025-01-16 DIAGNOSIS — Z23 ENCOUNTER FOR IMMUNIZATION: Primary | ICD-10-CM

## 2025-01-16 PROCEDURE — 90471 IMMUNIZATION ADMIN: CPT

## 2025-01-16 PROCEDURE — 90662 IIV NO PRSV INCREASED AG IM: CPT

## 2025-01-16 PROCEDURE — 99213 OFFICE O/P EST LOW 20 MIN: CPT | Performed by: FAMILY MEDICINE

## 2025-01-16 RX ORDER — LOSARTAN POTASSIUM 50 MG/1
50 TABLET ORAL DAILY
Qty: 30 TABLET | Refills: 2 | Status: SHIPPED | OUTPATIENT
Start: 2025-01-16

## 2025-01-16 NOTE — PROGRESS NOTES
"Name: Remy Edouard IV      : 1960      MRN: [id number]  Encounter Provider: Alexy Arnold MD  Encounter Date: 2025   Encounter department: [facility]  :  Assessment & Plan  Encounter for immunization  Administered today  Orders:    influenza vaccine, high-dose, PF 0.5 mL (Fluzone High Dose)    Mild hyperlipidemia  Lab Results   Component Value Date    CHOLESTEROL 202 (H) 2024    CHOLESTEROL 196 2023     Lab Results   Component Value Date    HDL 49 2024    HDL 43 2023     Lab Results   Component Value Date    TRIG 119 2024    TRIG 139 2023     Lab Results   Component Value Date    NONHDLC 153 2024     Due for repeat labs. Plans to incorporate more exercise. Has gained weight due to being more sedentary. May need to consider statin is uncontrolled with lifestyle adjustment  Orders:    Lipid panel; Future    Comprehensive metabolic panel; Future    Primary hypertension  Elevated at 150/90 on Losartan 25 mg daily. Consumes a low salt diet. BP also elevated at the last visit. Agreed to increased dose to 50 mg daily. 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   Constitutional:       General: He is not in acute distress.     Appearance: Normal appearance. He is not ill-appearing or " toxic-appearing.   HENT:      Head: Atraumatic.      Right Ear: Tympanic membrane normal.      Left Ear: Tympanic membrane normal.      Mouth/Throat:      Mouth: Mucous membranes are moist.   Eyes:      Extraocular Movements: Extraocular movements intact.   Cardiovascular:      Rate and Rhythm: Normal rate and regular rhythm.      Heart sounds: No murmur heard.  Pulmonary:      Effort: Pulmonary effort is normal. No respiratory distress.      Breath sounds: Normal breath sounds. No stridor. No wheezing, rhonchi or rales.   Abdominal:      General: There is no distension.      Palpations: Abdomen is soft. There is no mass.      Tenderness: There is no abdominal tenderness. There is no guarding or rebound.      Hernia: No hernia is present.   Musculoskeletal:      Right lower leg: Edema (tarce) present.      Left lower leg: No edema.   Lymphadenopathy:      Cervical: No cervical adenopathy.   Neurological:      Mental Status: He is alert and oriented to person, place, and time.   Psychiatric:         Mood and Affect: Mood normal.

## 2025-01-16 NOTE — ASSESSMENT & PLAN NOTE
Lab Results   Component Value Date    CHOLESTEROL 202 (H) 07/24/2024    CHOLESTEROL 196 02/17/2023     Lab Results   Component Value Date    HDL 49 07/24/2024    HDL 43 02/17/2023     Lab Results   Component Value Date    TRIG 119 07/24/2024    TRIG 139 02/17/2023     Lab Results   Component Value Date    NONHDLC 153 07/24/2024     Due for repeat labs. Plans to incorporate more exercise. Has gained weight due to being more sedentary. May need to consider statin is uncontrolled with lifestyle adjustment  Orders:    Lipid panel; Future    Comprehensive metabolic panel; Future

## 2025-01-17 ENCOUNTER — OFFICE VISIT (OUTPATIENT)
Age: 65
End: 2025-01-17

## 2025-01-17 VITALS — TEMPERATURE: 98.2 F

## 2025-01-17 DIAGNOSIS — D48.5 NEOPLASM OF UNCERTAIN BEHAVIOR OF SKIN: Primary | ICD-10-CM

## 2025-01-17 PROCEDURE — 88341 IMHCHEM/IMCYTCHM EA ADD ANTB: CPT | Performed by: STUDENT IN AN ORGANIZED HEALTH CARE EDUCATION/TRAINING PROGRAM

## 2025-01-17 PROCEDURE — 88305 TISSUE EXAM BY PATHOLOGIST: CPT | Performed by: STUDENT IN AN ORGANIZED HEALTH CARE EDUCATION/TRAINING PROGRAM

## 2025-01-17 PROCEDURE — 88342 IMHCHEM/IMCYTCHM 1ST ANTB: CPT | Performed by: STUDENT IN AN ORGANIZED HEALTH CARE EDUCATION/TRAINING PROGRAM

## 2025-01-17 NOTE — PROGRESS NOTES
"[facility] Note     Patient Name: Scotty Edouard IV  Encounter Date: 1/17/25     Have you been cared for by a [facility] Dermatologist in the last 3 years and, if so, which description applies to you?    NO.   I am considered a \"new\" patient and must complete all patient intake questions. I am MALE/not capable of bearing children.    REVIEW OF SYSTEMS:  Have you recently had or currently have any of the following? Recent fever or chills? No  Any non-healing wound? No   PAST MEDICAL HISTORY:  Have you personally ever had or currently have any of the following?  If \"YES,\" then please provide more detail. Skin cancer (such as Melanoma, Basal Cell Carcinoma, Squamous Cell Carcinoma?  No  Tuberculosis, HIV/AIDS, Hepatitis B or C: No  Radiation Treatment No   HISTORY OF IMMUNOSUPPRESSION:   Do you have a history of any of the following:  Systemic Immunosuppression such as Diabetes, Biologic or Immunotherapy, Chemotherapy, Organ Transplantation, Bone Marrow Transplantation or Prednisone?  No     Answering \"YES\" requires the addition of the dotphrase \"IMMUNOSUPPRESSED\" as the first diagnosis of the patient's visit.   FAMILY HISTORY:  Any \"first degree relatives\" (parent, brother, sister, or child) with the following?    Skin Cancer, Pancreatic or Other Cancer? No   PATIENT EXPERIENCE:    Do you want the Dermatologist to perform a COMPLETE skin exam today including a clinical examination under the \"bra and underwear\" areas?  NO  If necessary, do we have your permission to call and leave a detailed message on your Preferred Phone number that includes your specific medical information?  Yes      No Known Allergies   Current Outpatient Medications:     acyclovir (ZOVIRAX) 5 % ointment, Apply topically every 4 (four) hours, Disp: 15 g, Rfl: 1    Ascorbic Acid (vitamin C) 1000 MG tablet, Take 1,000 mg by mouth daily, Disp: , Rfl:     Cholecalciferol (VITAMIN D3 PO), Take 2,000 Units by mouth, Disp: , Rfl:     " "Coenzyme Q10 (Co Q 10) 10 MG CAPS, Take by mouth, Disp: , Rfl:     losartan (COZAAR) 50 mg tablet, Take 1 tablet (50 mg total) by mouth daily, Disp: 30 tablet, Rfl: 2    LYSINE PO, Take 1,000 mg by mouth, Disp: , Rfl:     Magnesium Oxide (MAG-CAPS PO), Take 400 mg by mouth, Disp: , Rfl:     Multiple Vitamins-Minerals (ONE-A-DAY 50 PLUS PO), Take by mouth, Disp: , Rfl:           Whom besides the patient is providing clinical information about today's encounter?   NO ADDITIONAL HISTORIAN (patient alone provided history)    Physical Exam and Assessment/Plan by Diagnosis:  NEOPLASM OF UNCERTAIN BEHAVIOR OF SKIN    Physical Exam:  (Anatomic Location); (Size and Morphological Description); (Differential Diagnosis):  Specimen A: superior sternal notch; 0.9 cm x 0.6 cm red scaly eroded plaque; Ddx: SCC  Specimen B: Inferior sternal notch ; 1 cm x 1.5 cm red scaly eroded plaque; Ddx: SCC  Pertinent Positives:  Pertinent Negatives:    Additional History of Present Condition:  present 2 years, nonhealing, no previous hx of skin ca    Assessment and Plan:  I have discussed with the patient that a sample of skin via a \"skin biopsy” would be potentially helpful to further make a specific diagnosis under the microscope.  Based on a thorough discussion of this condition and the management approach to it (including a comprehensive discussion of the known risks, side effects and potential benefits of treatment), the patient (family) agrees to implement the following specific plan:    Procedure:  Skin Biopsy.  After a thorough discussion of treatment options and risk/benefits/alternatives (including but not limited to local pain, scarring, dyspigmentation, blistering, possible superinfection, and inability to confirm a diagnosis via histopathology), verbal and written consent were obtained and portion of the rash was biopsied for tissue sample.  See below for consent that was obtained from patient and subsequent Procedure Note. " "  PROCEDURE TANGENTIAL (SHAVE) BIOPSY NOTE:    Performing Physician:   Anatomic Location; Clinical Description with size (cm); Pre-Op Diagnosis:   Specimen A: superior sternal notch; 0.9 cm x 0.6 cm red scaly eroded plaque; Ddx: SCC  Specimen B: Inferior sternal notch ; 1 cm x 1.5 cm red scaly eroded plaque; Ddx: SCC  Post-op diagnosis: Same     Local anesthesia: 1% xylocaine with epi      Topical anesthesia: None    Hemostasis: Aluminum chloride       After obtaining informed consent  at which time there was a discussion about the purpose of biopsy  and low risks of infection and bleeding.  The area was prepped and draped in the usual fashion. Anesthesia was obtained with 1% lidocaine with epinephrine. A shave biopsy to an appropriate sampling depth was obtained by Shave (Dermablade or 15 blade) The resulting wound was covered with surgical ointment and bandaged appropriately.     The patient tolerated the procedure well without complications and was without signs of functional compromise.      Specimen has been sent for review by Dermatopathology.    Standard post-procedure care has been explained and has been included in written form within the patient's copy of Informed Consent.    INFORMED CONSENT DISCUSSION AND POST-OPERATIVE INSTRUCTIONS FOR PATIENT    I.  RATIONALE FOR PROCEDURE  I understand that a skin biopsy allows the Dermatologist to test a lesion or rash under the microscope to obtain a diagnosis.  It usually involves numbing the area with numbing medication and removing a small piece of skin; sometimes the area will be closed with sutures. In this specific procedure, sutures are not usually needed.  If any sutures are placed, then they are usually need to be removed in 2 weeks or less.    I understand that my Dermatologist recommends that a skin \"shave\" biopsy be performed today.  A local anesthetic, similar to the kind that a dentist uses when filling a cavity, will be injected with a very " "small needle into the skin area to be sampled.  The injected skin and tissue underneath \"will go to sleep” and become numb so no pain should be felt afterwards.  An instrument shaped like a tiny \"razor blade\" (shave biopsy instrument) will be used to cut a small piece of tissue and skin from the area so that a sample of tissue can be taken and examined more closely under the microscope.  A slight amount of bleeding will occur, but it will be stopped with direct pressure and a pressure bandage and any other appropriate methods.  I understands that a scar will form where the wound was created.  Surgical ointment will be applied to help protect the wound.  Sutures are not usually needed.    II.  RISKS AND POTENTIAL COMPLICATIONS   I understand the risks and potential complications of a skin biopsy include but are not limited to the following:  Bleeding  Infection  Pain  Scar/keloid  Skin discoloration  Incomplete Removal  Recurrence  Nerve Damage/Numbness/Loss of Function  Allergic Reaction to Anesthesia  Biopsies are diagnostic procedures and based on findings additional treatment or evaluation may be required  Loss or destruction of specimen resulting in no additional findings    My Dermatologist has explained to me the nature of the condition, the nature of the procedure, and the benefits to be reasonably expected compared with alternative approaches.  My Dermatologist has discussed the likelihood of major risks or complications of this procedure including the specific risks listed above, such as bleeding, infection, and scarring/keloid.  I understand that a scar is expected after this procedure.  I understand that my physician cannot predict if the scar will form a \"keloid,\" which extends beyond the borders of the wound that is created.  A keloid is a thick, painful, and bumpy scar.  A keloid can be difficult to treat, as it does not always respond well to therapy, which includes injecting cortisone directly into " "the keloid every few weeks.  While this usually reduces the pain and size of the scar, it does not eliminate it.      I understand that photographs may be taken before and after the procedure.  These will be maintained as part of the medical providers confidential records and may not be made available to me.  I further authorize the medical provider to use the photographs for teaching purposes or to illustrate scientific papers, books, or lectures if in his/her judgment, medical research, education, or science may benefit from its use.    I have had an opportunity to fully inquire about the risks and benefits of this procedure and its alternatives.   I have been given ample time and opportunity to ask questions and to seek a second opinion if I wished to do so.  I acknowledge that there have specifically been no guarantees as to the cosmetic results from the procedure.  I am aware that with any procedure there is always the possibility of an unexpected complication.    III. POST-PROCEDURAL CARE (WHAT YOU WILL NEED TO DO \"AFTER THE BIOPSY\" TO OPTIMIZE HEALING)    Keep the area clean and dry.  Try NOT to remove the bandage or get it wet for the first 24 hours.    Gently clean the area and apply surgical ointment (such as Vaseline petrolatum ointment, which is available \"over the counter\" and not a prescription) to the biopsy site for up to 2 weeks straight.  This acts to protect the wound from the outside world.      Sutures are not usually placed in this procedure.  If any sutures were placed, return for suture removal as instructed (generally 1 week for the face, 2 weeks for the body).      Take Acetaminophen (Tylenol) for discomfort, if no contraindications.  Ibuprofen or aspirin could make bleeding worse.    Call our office immediately for signs of infection: fever, chills, increased redness, warmth, tenderness, discomfort/pain, or pus or foul smell coming from the wound.    WHAT TO DO IF THERE IS ANY " BLEEDING?  If a small amount of bleeding is noticed, place a clean cloth over the area and apply firm pressure for ten minutes.  Check the wound after 10 minutes of direct pressure.  If bleeding persists, try one more time for an additional 10 minutes of direct pressure on the area.  If the bleeding becomes heavier or does not stop after the second attempt, or if you have any other questions about this procedure, then please call your [facility]'s Dermatologist by calling [phone] (SKIN).     I hereby acknowledge that I have reviewed and verified the site with my Dermatologist and have requested and authorized my Dermatologist to proceed with the procedure.         Scribe Attestation      I,:  Sonali Jeffrey MA am acting as a scribe while in the presence of the attending physician.:       I,:  Brendan Josue MD personally performed the services described in this documentation    as scribed in my presence.:

## 2025-01-17 NOTE — PATIENT INSTRUCTIONS
"INFORMED CONSENT DISCUSSION AND POST-OPERATIVE INSTRUCTIONS FOR PATIENT    I.  RATIONALE FOR PROCEDURE  I understand that a skin biopsy allows the Dermatologist to test a lesion or rash under the microscope to obtain a diagnosis.  It usually involves numbing the area with numbing medication and removing a small piece of skin; sometimes the area will be closed with sutures. In this specific procedure, sutures are not usually needed.  If any sutures are placed, then they are usually need to be removed in 2 weeks or less.    I understand that my Dermatologist recommends that a skin \"shave\" biopsy be performed today.  A local anesthetic, similar to the kind that a dentist uses when filling a cavity, will be injected with a very small needle into the skin area to be sampled.  The injected skin and tissue underneath \"will go to sleep” and become numb so no pain should be felt afterwards.  An instrument shaped like a tiny \"razor blade\" (shave biopsy instrument) will be used to cut a small piece of tissue and skin from the area so that a sample of tissue can be taken and examined more closely under the microscope.  A slight amount of bleeding will occur, but it will be stopped with direct pressure and a pressure bandage and any other appropriate methods.  I understands that a scar will form where the wound was created.  Surgical ointment will be applied to help protect the wound.  Sutures are not usually needed.    II.  RISKS AND POTENTIAL COMPLICATIONS   I understand the risks and potential complications of a skin biopsy include but are not limited to the following:  Bleeding  Infection  Pain  Scar/keloid  Skin discoloration  Incomplete Removal  Recurrence  Nerve Damage/Numbness/Loss of Function  Allergic Reaction to Anesthesia  Biopsies are diagnostic procedures and based on findings additional treatment or evaluation may be required  Loss or destruction of specimen resulting in no additional findings    My Dermatologist " "has explained to me the nature of the condition, the nature of the procedure, and the benefits to be reasonably expected compared with alternative approaches.  My Dermatologist has discussed the likelihood of major risks or complications of this procedure including the specific risks listed above, such as bleeding, infection, and scarring/keloid.  I understand that a scar is expected after this procedure.  I understand that my physician cannot predict if the scar will form a \"keloid,\" which extends beyond the borders of the wound that is created.  A keloid is a thick, painful, and bumpy scar.  A keloid can be difficult to treat, as it does not always respond well to therapy, which includes injecting cortisone directly into the keloid every few weeks.  While this usually reduces the pain and size of the scar, it does not eliminate it.      I understand that photographs may be taken before and after the procedure.  These will be maintained as part of the medical providers confidential records and may not be made available to me.  I further authorize the medical provider to use the photographs for teaching purposes or to illustrate scientific papers, books, or lectures if in his/her judgment, medical research, education, or science may benefit from its use.    I have had an opportunity to fully inquire about the risks and benefits of this procedure and its alternatives.   I have been given ample time and opportunity to ask questions and to seek a second opinion if I wished to do so.  I acknowledge that there have specifically been no guarantees as to the cosmetic results from the procedure.  I am aware that with any procedure there is always the possibility of an unexpected complication.    III. POST-PROCEDURAL CARE (WHAT YOU WILL NEED TO DO \"AFTER THE BIOPSY\" TO OPTIMIZE HEALING)    Keep the area clean and dry.  Try NOT to remove the bandage or get it wet for the first 24 hours.    Gently clean the area and apply " "surgical ointment (such as Vaseline petrolatum ointment, which is available \"over the counter\" and not a prescription) to the biopsy site for up to 2 weeks straight.  This acts to protect the wound from the outside world.      Sutures are not usually placed in this procedure.  If any sutures were placed, return for suture removal as instructed (generally 1 week for the face, 2 weeks for the body).      Take Acetaminophen (Tylenol) for discomfort, if no contraindications.  Ibuprofen or aspirin could make bleeding worse.    Call our office immediately for signs of infection: fever, chills, increased redness, warmth, tenderness, discomfort/pain, or pus or foul smell coming from the wound.    WHAT TO DO IF THERE IS ANY BLEEDING?  If a small amount of bleeding is noticed, place a clean cloth over the area and apply firm pressure for ten minutes.  Check the wound after 10 minutes of direct pressure.  If bleeding persists, try one more time for an additional 10 minutes of direct pressure on the area.  If the bleeding becomes heavier or does not stop after the second attempt, or if you have any other questions about this procedure, then please call your [facility]'s Dermatologist by calling [phone] (SKIN).     I hereby acknowledge that I have reviewed and verified the site with my Dermatologist and have requested and authorized my Dermatologist to proceed with the procedure.  "

## 2025-01-23 PROCEDURE — 88305 TISSUE EXAM BY PATHOLOGIST: CPT | Performed by: STUDENT IN AN ORGANIZED HEALTH CARE EDUCATION/TRAINING PROGRAM

## 2025-01-23 PROCEDURE — 88342 IMHCHEM/IMCYTCHM 1ST ANTB: CPT | Performed by: STUDENT IN AN ORGANIZED HEALTH CARE EDUCATION/TRAINING PROGRAM

## 2025-01-23 PROCEDURE — 88341 IMHCHEM/IMCYTCHM EA ADD ANTB: CPT | Performed by: STUDENT IN AN ORGANIZED HEALTH CARE EDUCATION/TRAINING PROGRAM

## 2025-01-24 ENCOUNTER — RESULTS FOLLOW-UP (OUTPATIENT)
Age: 65
End: 2025-01-24

## 2025-01-24 NOTE — RESULT ENCOUNTER NOTE
DERMATOPATHOLOGY RESULT NOTE    Results reviewed by ordering physician.  Called patient to personally discuss results. No answer, left voicemail with result.      Instructions for Clinical Derm Team:   (remember to route Result Note to appropriate staff):    Call patient and schedule for ED&C sites A and B w Dr. Josue    Result & Plan by Specimen:    Specimen A: malignant  Plan: electrodessication and curretage      Specimen B: malignant  Plan: electrodessication and curretage        Tissue Exam: [id number]  Order: [id number]   Status: Final result      Dx: Neoplasm of uncertain behavior of skin    Test Result Released: Yes (seen)    View Follow-Up Encounter     Component  Ref Range & Units (hover)   Case Report  Surgical Pathology Report                         Case: [id number]                                  Authorizing Provider:  Hawa Irby MD              Collected:           01/17/2025 1522              Ordering Location:     [facility]      Received:            01/17/2025 1522                                     [place]                                                                    Pathologist:           Felicity Washington MD                                                          Specimens:   A) - Skin, Other, Specimen A: superior sternal notch                                               B) - Skin, Other, Specimen B: inferior sternal notch                                    Final Diagnosis  A. Skin, superior sternal notch, shave biopsy:    SQUAMOUS CELL CARCINOMA IN SITU; extends to the tissue edges (see note).    Note: SOX10 and p40 immunostains were reviewed and support the diagnosis.         B. Skin, inferior sternal notch, shave biopsy:    Focal SQUAMOUS CELL CARCINOMA IN SITU arising in an proliferative actinic keratosis, inflamed (see note).    Note: SOX10, p16, and p40 immunostains were reviewed and support the diagnosis.       Electronically signed by Felicity Washington MD on  "1/23/2025 at 1522 EST  Additional Information   All reported additional testing was performed with appropriately reactive controls.  These tests were developed and their performance characteristics determined by [facility] or appropriate performing facility, though some tests may be performed on tissues which have not been validated for performance characteristics (such as staining performed on alcohol exposed cell blocks and decalcified tissues).  Results should be interpreted with caution and in the context of the patients' clinical condition. These tests may not be cleared or approved by the U.S. Food and Drug Administration, though the FDA has determined that such clearance or approval is not necessary. These tests are used for clinical purposes and they should not be regarded as investigational or for research. This laboratory has been approved by [place] [id number], designated as a high-complexity laboratory and is qualified to perform these tests.  .  Gross Description   A. The specimen is received in formalin, labeled with the patient's name and hospital number, and is designated \" superior sternal notch.\"  The specimen consists of a pale-tan shave of skin that is 0.9 x 0.5 x 0.1 cm.  The skin surface has an ill-defined yellow-tan scaly lesion that is 0.6 x 0.3 cm.  The deep resection margin is inked green, and the skin surfaces inked red.  The specimen submitted between sponges.  Total 1 cassette.  B.  The specimen is received in formalin, labeled with the patient's name and hospital number, and is designated \" inferior sternal notch.\"  The specimen consists of a pale-tan shave of hairbearing skin that is 0.8 x 0.6 x 0.1 cm.  The surface has tan-brown scaly area that is 0.5 x 0.4 cm.  The deep resection margin is inked green, and the skin surfaces inked red.  The specimen is trisected and submitted between sponges.  Total of 1 cassette.  Note: The estimated total formalin fixation time based upon " information provided by the submitting clinician and the standard processing schedule is under 72 hours.  TStevens  Clinical Information   ATTENTION:  DERMPATH GROUP    SPECIMEN A; Skin; Anatomic Location: superior sternal notch; Procedure/Protocol: Skin Specimen (submit in FORMALIN):Tangential Biopsy (includes shave, scoop, saucerization, curette) (CPT 20904; each additional tangential biopsy is CPT 93434)  64 y.o. year old  Male with a Morphological Description:0.9 cm x 0.6 cm red scaly eroded plaque  Differential Diagnosis and/or Specific Clinical Question:SCC    ATTENTION:  DERMPATH GROUP    SPECIMEN B; Skin; Anatomic Location: inferior sternal notch; Procedure/Protocol: Skin Specimen (submit in FORMALIN):Tangential Biopsy (includes shave, scoop, saucerization, curette) (CPT 74879; each additional tangential biopsy is CPT 40606)  64 y.o. year old  Male with a Morphological Description:1 cm x 1.5 cm red scaly eroded plaque  Differential Diagnosis and/or Specific Clinical Question:SCC

## 2025-01-27 NOTE — TELEPHONE ENCOUNTER
Patient called to check on the status of a call back he was promised to be scheduled    Patient is scheduled

## 2025-02-18 ENCOUNTER — PROCEDURE VISIT (OUTPATIENT)
Age: 65
End: 2025-02-18
Payer: COMMERCIAL

## 2025-02-18 VITALS
SYSTOLIC BLOOD PRESSURE: 148 MMHG | HEART RATE: 80 BPM | TEMPERATURE: 97.6 F | BODY MASS INDEX: 29.95 KG/M2 | DIASTOLIC BLOOD PRESSURE: 79 MMHG | WEIGHT: 227 LBS

## 2025-02-18 DIAGNOSIS — D04.5: Primary | ICD-10-CM

## 2025-02-18 DIAGNOSIS — I10 PRIMARY HYPERTENSION: ICD-10-CM

## 2025-02-18 PROCEDURE — 17262 DSTRJ MAL LES T/A/L 1.1-2.0: CPT | Performed by: REGISTERED NURSE

## 2025-02-18 PROCEDURE — 17261 DSTRJ MAL LES T/A/L .6-1.0CM: CPT | Performed by: REGISTERED NURSE

## 2025-02-18 NOTE — PROGRESS NOTES
"[facility] Dermatology Clinic Note     Patient Name: Scotty Edouard IV  Encounter Date: 02/18/2025     Have you been cared for by a [facility] Dermatologist in the last 3 years and, if so, which description applies to you?    Yes.  I have been here within the last 3 years, and my medical history has NOT changed since that time.  I am MALE/not capable of bearing children.    REVIEW OF SYSTEMS:  Have you recently had or currently have any of the following? No changes in my recent health.   PAST MEDICAL HISTORY:  Have you personally ever had or currently have any of the following?  If \"YES,\" then please provide more detail. No changes in my medical history.   HISTORY OF IMMUNOSUPPRESSION: Do you have a history of any of the following:  Systemic Immunosuppression such as Diabetes, Biologic or Immunotherapy, Chemotherapy, Organ Transplantation, Bone Marrow Transplantation or Prednisone?  No     Answering \"YES\" requires the addition of the dotphrase \"IMMUNOSUPPRESSED\" as the first diagnosis of the patient's visit.   FAMILY HISTORY:  Any \"first degree relatives\" (parent, brother, sister, or child) with the following?    No changes in my family's known health.   PATIENT EXPERIENCE:    Do you want the Dermatologist to perform a COMPLETE skin exam today including a clinical examination under the \"bra and underwear\" areas?  NO  If necessary, do we have your permission to call and leave a detailed message on your Preferred Phone number that includes your specific medical information?  Yes      No Known Allergies   Current Outpatient Medications:     acyclovir (ZOVIRAX) 5 % ointment, Apply topically every 4 (four) hours, Disp: 15 g, Rfl: 1    Ascorbic Acid (vitamin C) 1000 MG tablet, Take 1,000 mg by mouth daily, Disp: , Rfl:     Cholecalciferol (VITAMIN D3 PO), Take 2,000 Units by mouth, Disp: , Rfl:     Coenzyme Q10 (Co Q 10) 10 MG CAPS, Take by mouth, Disp: , Rfl:     losartan (COZAAR) 50 mg tablet, Take 1 tablet (50 mg " total) by mouth daily, Disp: 30 tablet, Rfl: 2    LYSINE PO, Take 1,000 mg by mouth, Disp: , Rfl:     Magnesium Oxide (MAG-CAPS PO), Take 400 mg by mouth, Disp: , Rfl:     Multiple Vitamins-Minerals (ONE-A-DAY 50 PLUS PO), Take by mouth, Disp: , Rfl:           Whom besides the patient is providing clinical information about today's encounter?   NO ADDITIONAL HISTORIAN (patient alone provided history)    Physical Exam and Assessment/Plan by Diagnosis:    CURETTAGE AND DESICCATION Malignant and Premalignant Lesion    Diagnosis: Squamous cell carcinoma in situ  Prior biopsy: Yes  Access Number: [id number]  Location superior sternal notch  Size preop 0.9 cm  Size postop 1.2 cm    Additional History of Present Condition:  Biopsy confirmed 01/17/2025.    Assessment and Plan:  Based on a thorough discussion of this condition and the management approach to it (including a comprehensive discussion of the known risks, side effects and potential benefits of treatment), the patient (family) agrees to treat the above described lesion with desiccation and curettage.    Procedure:  The area was cleanly  prepped in usual manner  Anesthesia:1% xylocaine with epi    The above described lesion was aggressively curetted to mid dermis followed by desiccation  Number of cycles of desiccation and curettage 3  A clean dry dressing was placed on site. Oral and written postop care instructions were discussed and reviewed    CURETTAGE AND DESICCATION Malignant and Premalignant Lesion    Diagnosis: Squamous cell carcinoma in situ  Prior biopsy: Yes  Access Number: [id number]  Location inferior sternal notch  Size preop 1.5 cm  Size postop 1.9 cm    Additional History of Present Condition:  Biopsy confirmed 01/17/2025.    Assessment and Plan:  Based on a thorough discussion of this condition and the management approach to it (including a comprehensive discussion of the known risks, side effects and potential benefits of treatment), the patient  (family) agrees to treat the above described lesion with desiccation and curettage.    Procedure:  The area was cleanly  prepped in usual manner  Anesthesia:1% xylocaine with epi    The above described lesion was aggressively curetted to mid dermis followed by desiccation  Number of cycles of desiccation and curettage 3  A clean dry dressing was placed on site. Oral and written postop care instructions were discussed and reviewed      DISCUSSION OF TREATMENT AND POSTOP CARE FOR PATIENT    What is curettage and desiccation?  Curettage and desiccation is a type of electrosurgery in which a skin lesion is scraped off and heat is applied to the skin surface.    What is involved in curettage and cautery?  Your doctor will explain to you why your skin lesion needs treatment and the procedure involved. You may have to sign a consent form to indicate that you consent to the surgical procedure. Tell your doctor if you are taking any medication, or if you have any allergies or medical conditions.  The doctor will inject some local anaesthetic into the area surrounding the lesion to be treated. This will make the skin go numb so no pain should be felt during the procedure. You may feel a pushing sensation but this should not be painful. The skin lesion is scraped off with a curette, which is like a small spoon with very sharp edges.  The lesion should be sent to a pathology laboratory for analysis. The wound surface is then cauterised with a hot wire beaded tip or electrosurgical unit (diathermy). This stops bleeding and helps destroys any remaining skin tumour cells.  This procedure is usually repeated twice for malignant skin lesions (serial curettage and cautery).  A dressing may be applied and instructions should be given on how to care for your wound.    What types of skin lesions can be treated by curettage?  Curettage is suitable to treat lesions where the material being scraped off is softer than the surrounding skin  or when there is a natural cleavage plane between the lesion and the surrounding normal tissue. The following are sometimes treated by curettage:  Squamous cell carcinoma in situ   Actinic keratoses   Basal cell carcinomas that are large, deep or recurrent are usually not suitable for curettage. Lesions with poorly defined edges are also generally unsuitable.Curettage and desiccation is most often used in more superficial type basal cell carcinoma.    Will I have a scar?  Curettage often results in some sort of scar especially if accompanied by cautery.   The scars from curettage are usually flat and round. They are a similar size to that of the original skin lesion. Some people have an abnormal response to skin healing and these people may get larger scars than usual (keloids and hypertrophic scarring).    How do I look after the wound following skin curettage?  The wound may be tender when the local anaesthetic wears off.  Leave the dressing in place till the nest day. Avoid strenuous exertion and stretching of the area.   If there is any bleeding, press on the wound firmly with a folded towel without looking at it for 30 minutes. If it is still bleeding after this time, seek medical attention.  Follow instructions a written in our consent ,  The wound from curettage will take approximately 2-3 weeks to heal over. The scar will initially be red and raised but usually reduces in colour and size over several months.      Scribe Attestation      I,:  Tamie Dumas MA am acting as a scribe while in the presence of the attending physician.:       I,:  Brendan Josue MD personally performed the services described in this documentation    as scribed in my presence.:

## 2025-02-19 RX ORDER — LOSARTAN POTASSIUM 50 MG/1
50 TABLET ORAL DAILY
Qty: 90 TABLET | Refills: 1 | Status: SHIPPED | OUTPATIENT
Start: 2025-02-19

## 2025-07-25 ENCOUNTER — APPOINTMENT (OUTPATIENT)
Dept: LAB | Age: 65
End: 2025-07-25
Attending: FAMILY MEDICINE
Payer: COMMERCIAL

## 2025-07-25 DIAGNOSIS — E78.5 MILD HYPERLIPIDEMIA: ICD-10-CM

## 2025-07-25 LAB
ALBUMIN SERPL BCG-MCNC: 3.7 G/DL (ref 3.5–5)
ALP SERPL-CCNC: 56 U/L (ref 34–104)
ALT SERPL W P-5'-P-CCNC: 20 U/L (ref 7–52)
ANION GAP SERPL CALCULATED.3IONS-SCNC: 8 MMOL/L (ref 4–13)
AST SERPL W P-5'-P-CCNC: 18 U/L (ref 13–39)
BILIRUB SERPL-MCNC: 0.93 MG/DL (ref 0.2–1)
BUN SERPL-MCNC: 16 MG/DL (ref 5–25)
CALCIUM SERPL-MCNC: 8.6 MG/DL (ref 8.4–10.2)
CHLORIDE SERPL-SCNC: 104 MMOL/L (ref 96–108)
CHOLEST SERPL-MCNC: 211 MG/DL (ref ?–200)
CO2 SERPL-SCNC: 29 MMOL/L (ref 21–32)
CREAT SERPL-MCNC: 0.89 MG/DL (ref 0.6–1.3)
CREAT UR-MCNC: 162.3 MG/DL
GFR SERPL CREATININE-BSD FRML MDRD: 89 ML/MIN/1.73SQ M
GLUCOSE P FAST SERPL-MCNC: 89 MG/DL (ref 65–99)
HDLC SERPL-MCNC: 43 MG/DL
LDLC SERPL CALC-MCNC: 136 MG/DL (ref 0–100)
MICROALBUMIN UR-MCNC: 10.4 MG/L
MICROALBUMIN/CREAT 24H UR: 6 MG/G CREATININE (ref 0–30)
NONHDLC SERPL-MCNC: 168 MG/DL
POTASSIUM SERPL-SCNC: 4.2 MMOL/L (ref 3.5–5.3)
PROT SERPL-MCNC: 6.8 G/DL (ref 6.4–8.4)
SODIUM SERPL-SCNC: 141 MMOL/L (ref 135–147)
TRIGL SERPL-MCNC: 162 MG/DL (ref ?–150)

## 2025-07-25 PROCEDURE — 80053 COMPREHEN METABOLIC PANEL: CPT

## 2025-07-25 PROCEDURE — 80061 LIPID PANEL: CPT

## 2025-07-25 PROCEDURE — 36415 COLL VENOUS BLD VENIPUNCTURE: CPT

## 2025-07-30 ENCOUNTER — OFFICE VISIT (OUTPATIENT)
Dept: FAMILY MEDICINE CLINIC | Facility: CLINIC | Age: 65
End: 2025-07-30
Payer: COMMERCIAL

## 2025-07-30 VITALS
TEMPERATURE: 96.4 F | HEIGHT: 72 IN | OXYGEN SATURATION: 97 % | HEART RATE: 86 BPM | WEIGHT: 229 LBS | SYSTOLIC BLOOD PRESSURE: 142 MMHG | RESPIRATION RATE: 16 BRPM | BODY MASS INDEX: 31.02 KG/M2 | DIASTOLIC BLOOD PRESSURE: 78 MMHG

## 2025-07-30 DIAGNOSIS — I10 PRIMARY HYPERTENSION: ICD-10-CM

## 2025-07-30 DIAGNOSIS — E78.5 MILD HYPERLIPIDEMIA: ICD-10-CM

## 2025-07-30 DIAGNOSIS — Z00.00 ANNUAL PHYSICAL EXAM: Primary | ICD-10-CM

## 2025-07-30 DIAGNOSIS — Z12.11 COLON CANCER SCREENING: ICD-10-CM

## 2025-07-30 PROCEDURE — 99397 PER PM REEVAL EST PAT 65+ YR: CPT | Performed by: FAMILY MEDICINE

## (undated) DEVICE — NEEDLE 22 G X 1 1/2 SAFETY

## (undated) DEVICE — DRAPE EQUIPMENT RF WAND

## (undated) DEVICE — SUT VICRYL 3-0 SH 27 IN J416H

## (undated) DEVICE — GAUZE SPONGES,16 PLY: Brand: CURITY

## (undated) DEVICE — SUT VICRYL 2-0 SH 27 IN UNDYED J417H

## (undated) DEVICE — BETHLEHEM UNIVERSAL MINOR GEN: Brand: CARDINAL HEALTH

## (undated) DEVICE — CHLORAPREP HI-LITE 26ML ORANGE

## (undated) DEVICE — SUT MONOCRYL 4-0 PS-2 27 IN Y426H

## (undated) DEVICE — GLOVE SRG BIOGEL ORTHOPEDIC 8

## (undated) DEVICE — 3M™ TEGADERM™ TRANSPARENT FILM DRESSING FRAME STYLE, 1626W, 4 IN X 4-3/4 IN (10 CM X 12 CM), 50/CT 4CT/CASE: Brand: 3M™ TEGADERM™

## (undated) DEVICE — INTENDED FOR TISSUE SEPARATION, AND OTHER PROCEDURES THAT REQUIRE A SHARP SURGICAL BLADE TO PUNCTURE OR CUT.: Brand: BARD-PARKER SAFETY BLADES SIZE 15, STERILE

## (undated) DEVICE — ADHESIVE SKIN HIGH VISCOSITY EXOFIN 1ML

## (undated) DEVICE — PLUMEPEN PRO 10FT

## (undated) DEVICE — SUT MONOCRYL 4-0 PS-2 18 IN Y496G

## (undated) DEVICE — PAD GROUNDING ADULT

## (undated) DEVICE — DECANTER: Brand: UNBRANDED

## (undated) DEVICE — TOWEL SURG XR DETECT GREEN STRL RFD